# Patient Record
Sex: MALE | Race: BLACK OR AFRICAN AMERICAN | URBAN - METROPOLITAN AREA
[De-identification: names, ages, dates, MRNs, and addresses within clinical notes are randomized per-mention and may not be internally consistent; named-entity substitution may affect disease eponyms.]

---

## 2017-07-23 ENCOUNTER — EMERGENCY (EMERGENCY)
Facility: HOSPITAL | Age: 57
LOS: 0 days | Discharge: HOME | End: 2017-07-23
Admitting: INTERNAL MEDICINE

## 2017-07-23 DIAGNOSIS — R59.0 LOCALIZED ENLARGED LYMPH NODES: ICD-10-CM

## 2017-07-23 DIAGNOSIS — Z88.0 ALLERGY STATUS TO PENICILLIN: ICD-10-CM

## 2017-07-23 DIAGNOSIS — J02.9 ACUTE PHARYNGITIS, UNSPECIFIED: ICD-10-CM

## 2017-07-23 DIAGNOSIS — F31.9 BIPOLAR DISORDER, UNSPECIFIED: ICD-10-CM

## 2017-07-23 DIAGNOSIS — R47.81 SLURRED SPEECH: ICD-10-CM

## 2017-07-23 DIAGNOSIS — S92.909A UNSPECIFIED FRACTURE OF UNSPECIFIED FOOT, INITIAL ENCOUNTER FOR CLOSED FRACTURE: ICD-10-CM

## 2017-07-27 ENCOUNTER — EMERGENCY (EMERGENCY)
Facility: HOSPITAL | Age: 57
LOS: 0 days | Discharge: HOME | End: 2017-07-27
Admitting: INTERNAL MEDICINE

## 2017-07-27 DIAGNOSIS — Z79.899 OTHER LONG TERM (CURRENT) DRUG THERAPY: ICD-10-CM

## 2017-07-27 DIAGNOSIS — J02.9 ACUTE PHARYNGITIS, UNSPECIFIED: ICD-10-CM

## 2017-07-27 DIAGNOSIS — R47.81 SLURRED SPEECH: ICD-10-CM

## 2017-07-27 DIAGNOSIS — Z79.2 LONG TERM (CURRENT) USE OF ANTIBIOTICS: ICD-10-CM

## 2017-07-27 DIAGNOSIS — Z88.0 ALLERGY STATUS TO PENICILLIN: ICD-10-CM

## 2017-07-27 DIAGNOSIS — S92.909A UNSPECIFIED FRACTURE OF UNSPECIFIED FOOT, INITIAL ENCOUNTER FOR CLOSED FRACTURE: ICD-10-CM

## 2017-07-27 DIAGNOSIS — F31.9 BIPOLAR DISORDER, UNSPECIFIED: ICD-10-CM

## 2017-07-29 PROBLEM — Z00.00 ENCOUNTER FOR PREVENTIVE HEALTH EXAMINATION: Status: ACTIVE | Noted: 2017-07-29

## 2017-08-17 ENCOUNTER — APPOINTMENT (OUTPATIENT)
Dept: OTOLARYNGOLOGY | Facility: CLINIC | Age: 57
End: 2017-08-17
Payer: MEDICARE

## 2017-08-17 VITALS — WEIGHT: 245 LBS | BODY MASS INDEX: 37.13 KG/M2 | HEIGHT: 68 IN

## 2017-08-17 DIAGNOSIS — Z87.891 PERSONAL HISTORY OF NICOTINE DEPENDENCE: ICD-10-CM

## 2017-08-17 PROCEDURE — 31575 DIAGNOSTIC LARYNGOSCOPY: CPT

## 2017-08-17 PROCEDURE — 99204 OFFICE O/P NEW MOD 45 MIN: CPT | Mod: 25

## 2017-08-17 RX ORDER — OMEPRAZOLE 40 MG/1
40 CAPSULE, DELAYED RELEASE ORAL TWICE DAILY
Qty: 60 | Refills: 3 | Status: ACTIVE | COMMUNITY
Start: 2017-08-17 | End: 1900-01-01

## 2017-09-28 ENCOUNTER — APPOINTMENT (OUTPATIENT)
Dept: OTOLARYNGOLOGY | Facility: CLINIC | Age: 57
End: 2017-09-28

## 2017-10-26 ENCOUNTER — APPOINTMENT (OUTPATIENT)
Dept: OTOLARYNGOLOGY | Facility: CLINIC | Age: 57
End: 2017-10-26
Payer: MEDICAID

## 2017-10-26 DIAGNOSIS — J30.89 OTHER ALLERGIC RHINITIS: ICD-10-CM

## 2017-10-26 DIAGNOSIS — K21.9 GASTRO-ESOPHAGEAL REFLUX DISEASE W/OUT ESOPHAGITIS: ICD-10-CM

## 2017-10-26 DIAGNOSIS — J34.9 UNSPECIFIED DISORDER OF NOSE AND NASAL SINUSES: ICD-10-CM

## 2017-10-26 DIAGNOSIS — R07.0 PAIN IN THROAT: ICD-10-CM

## 2017-10-26 PROCEDURE — 31231 NASAL ENDOSCOPY DX: CPT

## 2017-10-26 PROCEDURE — 99213 OFFICE O/P EST LOW 20 MIN: CPT | Mod: 25

## 2017-10-26 RX ORDER — FEXOFENADINE HCL 60 MG/1
60 TABLET, FILM COATED ORAL
Qty: 40 | Refills: 3 | Status: ACTIVE | COMMUNITY
Start: 2017-10-26 | End: 1900-01-01

## 2017-10-26 RX ORDER — AZELASTINE HYDROCHLORIDE 205.5 UG/1
0.15 SPRAY, METERED NASAL
Qty: 2 | Refills: 6 | Status: ACTIVE | COMMUNITY
Start: 2017-10-26 | End: 1900-01-01

## 2019-09-26 ENCOUNTER — EMERGENCY (EMERGENCY)
Facility: HOSPITAL | Age: 59
LOS: 0 days | Discharge: HOME | End: 2019-09-26
Admitting: EMERGENCY MEDICINE
Payer: MEDICARE

## 2019-09-26 VITALS
RESPIRATION RATE: 18 BRPM | SYSTOLIC BLOOD PRESSURE: 119 MMHG | OXYGEN SATURATION: 97 % | TEMPERATURE: 98 F | HEART RATE: 91 BPM | DIASTOLIC BLOOD PRESSURE: 67 MMHG

## 2019-09-26 DIAGNOSIS — M54.9 DORSALGIA, UNSPECIFIED: ICD-10-CM

## 2019-09-26 DIAGNOSIS — Z88.0 ALLERGY STATUS TO PENICILLIN: ICD-10-CM

## 2019-09-26 DIAGNOSIS — M54.5 LOW BACK PAIN: ICD-10-CM

## 2019-09-26 DIAGNOSIS — G89.29 OTHER CHRONIC PAIN: ICD-10-CM

## 2019-09-26 PROCEDURE — 99283 EMERGENCY DEPT VISIT LOW MDM: CPT

## 2019-09-26 NOTE — ED PROVIDER NOTE - PATIENT PORTAL LINK FT
You can access the FollowMyHealth Patient Portal offered by [facility] by registering at the following website: [email or web address]. By joining [employer]’s FollowMyHealth portal, you will also be able to view your health information using other applications (apps) compatible with our system.

## 2019-09-26 NOTE — ED PROVIDER NOTE - CLINICAL SUMMARY MEDICAL DECISION MAKING FREE TEXT BOX
prednisone taper; rehab referral; neurology referral; pt will follow up with PCP in 2-3 days; any new or worsening symptoms, pt will return to ER.

## 2019-09-26 NOTE — ED PROVIDER NOTE - NSFOLLOWUPINSTRUCTIONS_ED_ALL_ED_FT
[employer]® CareNotes®     :  [facility]             CHRONIC BACK PAIN - Discharge Care     Chronic Back Pain    WHAT YOU NEED TO KNOW:    Chronic back pain is back pain that lasts 3 months or longer. This may include pain that has not been controlled or does not improve with treatment. Your back pain may cause weakness or pain that spreads to your arms or legs.    DISCHARGE INSTRUCTIONS:    Call your doctor if:     You have severe pain.      You have new numbness, tingling, or weakness, especially in your lower back, legs, arms, or genital area.      You lose control of your bladder or bowel movements.       You have a fever or sudden weight loss.      You have new or worse pain.      You have questions or concerns about your condition or care.    Medicines: You may need any of the following:     NSAIDs help decrease swelling and pain or fever. This medicine is available with or without a doctor's order. NSAIDs can cause stomach bleeding or kidney problems in certain people. If you take blood thinner medicine, always ask your healthcare provider if NSAIDs are safe for you. Always read the medicine label and follow directions.      Acetaminophen decreases pain and fever. It is available without a doctor's order. Ask how much to take and how often to take it. Follow directions. Read the labels of all other medicines you are using to see if they also contain acetaminophen, or ask your doctor or pharmacist. Acetaminophen can cause liver damage if not taken correctly. Do not use more than 4 grams (4,000 milligrams) total of acetaminophen in one day.       Muscle relaxers help decrease muscle spasms and back pain.      Prescription pain medicine may be given. Ask your healthcare provider how to take this medicine safely. Some prescription pain medicines contain acetaminophen. Do not take other medicines that contain acetaminophen without talking to your healthcare provider. Too much acetaminophen may cause liver damage. Prescription pain medicine may cause constipation. Ask your healthcare provider how to prevent or treat constipation.       Take your medicine as directed. Contact your healthcare provider if you think your medicine is not helping or if you have side effects. Tell him or her if you are allergic to any medicine. Keep a list of the medicines, vitamins, and herbs you take. Include the amounts, and when and why you take them. Bring the list or the pill bottles to follow-up visits. Carry your medicine list with you in case of an emergency.    Manage your symptoms:     Apply ice for 15 to 20 minutes every hour, or as directed. Use an ice pack, or put crushed ice in a plastic bag. Cover it with a towel before you apply it to your skin. Ice decreases pain and helps prevent tissue damage.      Apply heat for 20 to 30 minutes every 2 hours, or as directed. Heat helps decrease pain and muscle spasms.      Use massage to loosen tense muscles. Massage may relieve back pain caused by tight muscles. Regular massages may help prevent this kind of back pain.      Ask about acupuncture for pain relief. Back pain is sometimes relieved with acupuncture. Talk to your healthcare provider before you get this treatment to make sure it is safe for you.    Other ways to relieve or prevent back pain:     Manage stress. Stress can cause back pain or make it worse. Some ways to reduce stress are listening to music, meditating, or using aromatherapy. It may help to talk with a therapist about anything that is causing you stress. Your healthcare provider can give you more information.      Stay active as much as you can without causing more pain. Ask your healthcare provider what exercises are right for you. Do not sit or lie down for long periods. This could make your back pain worse. Yoga or similar gentle movements may help relieve pain and tension in your back. Go slowly and do not strain your back as you do any movement.      Be careful when you lift heavy objects. Do not lift anything heavy until your pain is gone. Never strain your back when you lift a heavy item. If possible, ask someone to help you.      Go to physical therapy as directed. A physical therapist can teach you exercises to help improve movement and strength, and to decrease pain.    Follow up with your healthcare provider as directed: You may be referred to a sports medicine or spine specialist. Write down your questions so you remember to ask them during your visits.       © Copyright [employer] 2019 All illustrations and images included in CareNotes are the copyrighted property of [employer] or [employer].      back to top                      © Copyright [employer] 2019

## 2019-09-26 NOTE — ED ADULT NURSE NOTE - CHIEF COMPLAINT QUOTE
fell three months ago, complaining of lower back pain for three months, history of back pain for thirty years. no blood thinners.

## 2019-09-26 NOTE — ED PROVIDER NOTE - NSFOLLOWUPCLINICS_GEN_ALL_ED_FT
[facility] ([facility])  Rehabilitation  [facility] [address] [place] [address]  Phone: [phone]  Fax:   Follow Up Time: 1-3 Days    [facility]  Neurology  [address] [place] [address], [address]  [place] [address]  Phone: [phone]  Fax:   Follow Up Time: 1-3 Days

## 2019-09-26 NOTE — ED PROVIDER NOTE - NEUROLOGICAL, MLM
Alert and oriented, no focal deficits, no motor or sensory deficits.  DTR's 2+ bilaterally.  Antalgic gait.

## 2019-09-26 NOTE — ED PROVIDER NOTE - OBJECTIVE STATEMENT
59 y.o. male with a PMH of anxiety and chronic back pain presented to the ER c/o Right sided back pain for the past 10 years.  Pt had MRI 10 years ago which revealed lumbar "slipped discs."  Pt has not had appropriate follow up.  Denies fever, chills, abdominal pain, flank pain, chest pain, extremity weakness/paresthesias, bladder/bowel incontinence, saddle numbness, dysuria, hematuria, ataxia. No other complaints.

## 2019-09-26 NOTE — ED ADULT TRIAGE NOTE - CHIEF COMPLAINT QUOTE
fell three months ago, complaining of lower back pain for three months, history of back pain for thirty years fell three months ago, complaining of lower back pain for three months, history of back pain for thirty years. no blood thinners.

## 2019-09-26 NOTE — ED ADULT NURSE NOTE - OBJECTIVE STATEMENT
Patient present to ED with complains of back pain from a fall sustained 3 months ago, denies blood thinner or syncope after wards.

## 2019-09-26 NOTE — ED PROVIDER NOTE - MUSCULOSKELETAL, MLM
Spine appears normal, range of motion is not limited, no midline tenderness.  (+) Right lower lumbar paraspinal tenderness.

## 2023-03-17 ENCOUNTER — EMERGENCY (EMERGENCY)
Facility: HOSPITAL | Age: 63
LOS: 0 days | Discharge: HOME | End: 2023-03-17
Attending: STUDENT IN AN ORGANIZED HEALTH CARE EDUCATION/TRAINING PROGRAM
Payer: MEDICARE

## 2023-03-17 VITALS
SYSTOLIC BLOOD PRESSURE: 167 MMHG | OXYGEN SATURATION: 97 % | RESPIRATION RATE: 20 BRPM | TEMPERATURE: 99 F | HEART RATE: 89 BPM | DIASTOLIC BLOOD PRESSURE: 81 MMHG

## 2023-03-17 DIAGNOSIS — F41.9 ANXIETY DISORDER, UNSPECIFIED: ICD-10-CM

## 2023-03-17 DIAGNOSIS — Z76.0 ENCOUNTER FOR ISSUE OF REPEAT PRESCRIPTION: ICD-10-CM

## 2023-03-17 DIAGNOSIS — F32.A DEPRESSION, UNSPECIFIED: ICD-10-CM

## 2023-03-17 DIAGNOSIS — Z88.0 ALLERGY STATUS TO PENICILLIN: ICD-10-CM

## 2023-03-17 PROCEDURE — 90792 PSYCH DIAG EVAL W/MED SRVCS: CPT | Mod: GC

## 2023-03-17 PROCEDURE — 99284 EMERGENCY DEPT VISIT MOD MDM: CPT

## 2023-03-17 PROCEDURE — 99281 EMR DPT VST MAYX REQ PHY/QHP: CPT

## 2023-03-17 RX ORDER — DULOXETINE HYDROCHLORIDE 30 MG/1
1 CAPSULE, DELAYED RELEASE ORAL
Qty: 15 | Refills: 0
Start: 2023-03-17 | End: 2023-03-31

## 2023-03-17 RX ORDER — OXCARBAZEPINE 300 MG/1
1 TABLET, FILM COATED ORAL
Qty: 30 | Refills: 0
Start: 2023-03-17 | End: 2023-03-31

## 2023-03-17 RX ORDER — TRAZODONE HCL 50 MG
1 TABLET ORAL
Qty: 15 | Refills: 0
Start: 2023-03-17 | End: 2023-03-31

## 2023-03-17 RX ORDER — VENLAFAXINE HCL 75 MG
1 CAPSULE, EXT RELEASE 24 HR ORAL
Qty: 15 | Refills: 0
Start: 2023-03-17 | End: 2023-03-31

## 2023-03-17 NOTE — BH CONSULTATION LIAISON ASSESSMENT NOTE - HPI (INCLUDE ILLNESS QUALITY, SEVERITY, DURATION, TIMING, CONTEXT, MODIFYING FACTORS, ASSOCIATED SIGNS AND SYMPTOMS)
Mr. Laguna is a 62 year old  male, , domiciled in a private house alone, unemployed on disability, with a past psychiatric history of bipolar 2 disorder with 2 hospitalizations at [facility] when he was 12 years old and a past medical history of chronic back pain and bilateral orthopedic foot surgeries presenting to the ED for refills of his medications. Medical team said that, should his prescriptions not be refilled today, he is at risk to return to the ED for refills. Psychiatry was consulted for medication management.    On approach, patient is sitting comfortably in his chair and not appearing in any acute distress. He was able to get up and move to an exam room to talk more privately, ambulating with a cane. He was cooperative to interview, albeit a little irritable when having to retell his story and anxious while pleading to help with medication refills. He said that he recently changed his insurance thinking that the new one would be better. However, his primary medical doctor doesn't accept the new insurance and his psychiatrist retired and closed down her office. He explained that he is diagnosed with bipolar 2 disorder and struggles with depression and anxiety as well as chronic back pain. He explains that without his medications, he experiences "shakes" and "nervous breakdowns" which he describes as lightheadedness, jolting in his knees, and falling. He took his last dose of medications this morning. He says that it usually takes about 2 days without medications for the symptoms to return, therefore he is worried about wasting any time without getting refills. He says that he is in the process of switching his insurance back to his old one. He explained that he has previously been admitted to [facility] 8 years ago after bilateral orthopedic foot surgeries when he had a "nervous breakdown" and also needed a boot for his right foot. Patient has no history of seizures and no current suicidal ideation, homicidal ideation, auditory hallucinations, or visual hallucinations. His pharmacy is [facility] at [address] [place] [address] ([phone]).  Patient provided a medication list that was incongruent with the pharmacy's list.     Pharmacy was called. Per pharmacy, medication list is as follows:  Venlafaxine 25mg, 1 tablet with food every morning- last filled 3/14/23  Trazodone 100mg, 2 tablets at bedtime everday- last filled 2/16/23   	patient's list says that he takes 1 tablet twice a day  Oxycarbazepine 600mg BID- last filled 2/16/23  Duloxetine 30mg, 1 tablet every morning- last filled 3/3/23  Duloxetine 60mg, q1d- last filled 1/15/23 with a 90 day supply  	patient's list says that he takes duloxetine 60mg BID  Alprazolam 1mg BID- last filled 2/16/23  	patient's list says that he takes 2mg four times a day  Per pharmacy, prescribing doctor is Dr. José Antonio Moore ([phone]).    Ex-wife, Leslie ([phone]) was called. Per ex wife, patient has a history of getting anxious when his medications are running low. He reportedly has no history of any self-harm or suicidal ideation or attempts.

## 2023-03-17 NOTE — ED PROVIDER NOTE - PATIENT PORTAL LINK FT
You can access the FollowMyHealth Patient Portal offered by [facility] by registering at the following website: [email or web address]. By joining [employer]’s FollowMyHealth portal, you will also be able to view your health information using other applications (apps) compatible with our system.

## 2023-03-17 NOTE — ED PROVIDER NOTE - PHYSICAL EXAMINATION
CONSTITUTIONAL: in no acute distress, afebrile  SKIN: Warm, dry  EYES: No conjunctival injection. EOMI. PERRLA  ENT: No nasal discharge; oropharynx nonerythematous; airway clear  NECK: Supple; non tender  CARD:  Regular rate and rhythm  RESP: CTAB  ABD: Soft NTND; No guarding or rebound tenderness  EXT: Normal ROM.  No clubbing or cyanosis.  No edema. No calf tenderness  NEURO: A&O x3, grossly unremarkable, no focal deficits  PSYCH: Cooperative, no SI/HI, AH/VH

## 2023-03-17 NOTE — BH CONSULTATION LIAISON ASSESSMENT NOTE - NSBHCONSULTFOLLOWAFTERCARE_PSY_A_CORE FT
Recommended that patient's duloxetine, venlafaxine, oxcarbasepine, and trazodone be prescribed again for a 2 week supply. He is also recommended for referral to the Adult Medicine at [facility] clinic at [address] [place] [address] [phone]) for help with prescriptions while he is undergoing insurance problems.   Recommended that patient's duloxetine,, oxcarbazepine, Trazadone to be prescribed again for a 2 week supply,  venlafaxine (was last prescribed on 3/14/23),  He is also recommended for referral to the Adult Medicine at [facility] clinic at [address] [place] [address] [phone]) for help with prescriptions while he is undergoing insurance problems.   Recommended that patient's duloxetine,, oxcarbazepine, Trazadone to be prescribed again for a 2 week supply,  venlafaxine (was last prescribed on 3/14/23),  He is also recommended for referral to outpatient psychiatry referral to [facility] located at [address] [place] [phone]; [phone]      Adult Medicine at [facility] at [address] [place] [phone]) for help with prescriptions while he is undergoing insurance problems.

## 2023-03-17 NOTE — BH CONSULTATION LIAISON ASSESSMENT NOTE - SUMMARY
Mr. Laguna is a 62 year old male with a past psychiatric history of bipolar 2 disorder with 2 hospitalizations at [facility] when he was 12 years old and a past medical history of chronic back pain and bilateral orthopedic foot surgeries presenting to the ED for refills of his medications. Psychiatry was consulted for medication management. He recently changed his insurance but his primary medical doctor doesn't accept the new insurance and his psychiatrist retired and closed down her office. Without his medications, he experiences "shakes" and "nervous breakdowns" which he describes as lightheadedness, jolting in his knees, and falling. He took his last dose of medications this morning. He is in the process of switching his insurance back to his old one. Patient has no history of seizures and no past or current suicidal ideation or attempts (also confirmed by his ex-wife, Leslie, [phone]), homicidal ideation, auditory hallucinations, or visual hallucinations. His pharmacy is [facility] at [address] [place] [address] ([phone]).   Per pharmacy, medication list is as follows:  Venlafaxine 25mg, 1 tablet with food every morning- last filled 3/14/23  Trazodone 100mg, 2 tablets at bedtime everday- last filled 2/16/23   	patient's list says that he takes 1 tablet twice a day  Oxycarbazepine 600mg BID- last filled 2/16/23  Duloxetine 30mg, 1 tablet every morning- last filled 3/3/23  Duloxetine 60mg, q1d- last filled 1/15/23 with a 90 day supply  	patient's list says that he takes duloxetine 60mg BID  Alprazolam 1mg BID- last filled 2/16/23  	patient's list says that he takes 2mg four times a day  Per pharmacy, prescribing doctor is Dr. José Antonio Moore ([phone]).    From the psychiatric perspective, patient's symptoms of lightheadedness, jolting in his knees, and falling when he is off his medications is consistent with SNRI withdrawal. As the patient is on 2 SNRIs (venlafaxine and duloxetine), we will recommend that those be prescribed again along with his oxcarbazepine and trazodone for a 2 week supply. He is also recommended for referral to the Adult Medicine at [facility] at [address] [place] [address] [phone]) for help with prescriptions while he is undergoing insurance problems. He has no psychiatric contraindications for discharge.

## 2023-03-17 NOTE — ED PROVIDER NOTE - NSFOLLOWUPCLINICS_GEN_ALL_ED_FT
[facility]  Medicine  [address], [place]   Phone: [phone]  Fax:   Follow Up Time: 1-3 Days    [facility]  OP Mental Health  [address] [place] [address]  Phone: [phone]  Fax:   Follow Up Time: 1-3 Days

## 2023-03-17 NOTE — ED PROVIDER NOTE - NSFOLLOWUPINSTRUCTIONS_ED_ALL_ED_FT
*************- Please  the medication sent to your pharmacy and take as prescribed  - Please follow up with the Psychiatry Clinic and the Medicine Clinic for follow up and further management**********      Generalized Anxiety Disorder, Adult  Generalized anxiety disorder (MEERA) is a mental health disorder. People with this condition constantly worry about everyday events. Unlike normal anxiety, worry related to MEERA is not triggered by a specific event. These worries also do not fade or get better with time. MEERA interferes with life functions, including relationships, work, and school.    MEERA can vary from mild to severe. People with severe MEERA can have intense waves of anxiety with physical symptoms (panic attacks).    What are the causes?  The exact cause of MEERA is not known.    What increases the risk?  This condition is more likely to develop in:    Women.  People who have a family history of anxiety disorders.  People who are very shy.  People who experience very stressful life events, such as the death of a loved one.  People who have a very stressful family environment.    What are the signs or symptoms?  People with MEERA often worry excessively about many things in their lives, such as their health and family. They may also be overly concerned about:    Doing well at work.  Being on time.  Natural disasters.  Friendships.    Physical symptoms of MEERA include:    Fatigue.  Muscle tension or having muscle twitches.  Trembling or feeling shaky.  Being easily startled.  Feeling like your heart is pounding or racing.  Feeling out of breath or like you cannot take a deep breath.  Having trouble falling asleep or staying asleep.  Sweating.  Nausea, diarrhea, or irritable bowel syndrome (IBS).  Headaches.  Trouble concentrating or remembering facts.  Restlessness.  Irritability.    How is this diagnosed?  Your health care provider can diagnose MEERA based on your symptoms and medical history. You will also have a physical exam. The health care provider will ask specific questions about your symptoms, including how severe they are, when they started, and if they come and go. Your health care provider may ask you about your use of alcohol or drugs, including prescription medicines. Your health care provider may refer you to a mental health specialist for further evaluation.    Your health care provider will do a thorough examination and may perform additional tests to rule out other possible causes of your symptoms.    To be diagnosed with MEERA, a person must have anxiety that:    Is out of his or her control.  Affects several different aspects of his or her life, such as work and relationships.  Causes distress that makes him or her unable to take part in normal activities.  Includes at least three physical symptoms of MEERA, such as restlessness, fatigue, trouble concentrating, irritability, muscle tension, or sleep problems.    Before your health care provider can confirm a diagnosis of MEERA, these symptoms must be present more days than they are not, and they must last for six months or longer.    How is this treated?  The following therapies are usually used to treat MEERA:    Medicine. Antidepressant medicine is usually prescribed for long-term daily control. Antianxiety medicines may be added in severe cases, especially when panic attacks occur.  Talk therapy (psychotherapy). Certain types of talk therapy can be helpful in treating MEERA by providing support, education, and guidance. Options include:    Cognitive behavioral therapy (CBT). People learn coping skills and techniques to ease their anxiety. They learn to identify unrealistic or negative thoughts and behaviors and to replace them with positive ones.  Acceptance and commitment therapy (ACT). This treatment teaches people how to be mindful as a way to cope with unwanted thoughts and feelings.  Biofeedback. This process trains you to manage your body's response (physiological response) through breathing techniques and relaxation methods. You will work with a therapist while machines are used to monitor your physical symptoms.    Stress management techniques. These include yoga, meditation, and exercise.    ImageA mental health specialist can help determine which treatment is best for you. Some people see improvement with one type of therapy. However, other people require a combination of therapies.    Follow these instructions at home:  Take over-the-counter and prescription medicines only as told by your health care provider.  Try to maintain a normal routine.  Try to anticipate stressful situations and allow extra time to manage them.  Practice any stress management or self-calming techniques as taught by your health care provider.  Do not punish yourself for setbacks or for not making progress.  Try to recognize your accomplishments, even if they are small.  Keep all follow-up visits as told by your health care provider. This is important.  Contact a health care provider if:  Your symptoms do not get better.  Your symptoms get worse.  You have signs of depression, such as:    A persistently sad, cranky, or irritable mood.  Loss of enjoyment in activities that used to bring you flori.  Change in weight or eating.  Changes in sleeping habits.  Avoiding friends or family members.  Loss of energy for normal tasks.  Feelings of guilt or worthlessness.    Get help right away if:  You have serious thoughts about hurting yourself or others.  If you ever feel like you may hurt yourself or others, or have thoughts about taking your own life, get help right away. You can go to your nearest emergency department or call:     Your local emergency services (911 in the [place]   A suicide crisis helpline, such as the National Suicide Prevention Lifeline at 1-518.251.8468. This is open 24 hours a day.     Summary  Generalized anxiety disorder (MEERA) is a mental health disorder that involves worry that is not triggered by a specific event.  People with MEERA often worry excessively about many things in their lives, such as their health and family.  MEERA may cause physical symptoms such as restlessness, trouble concentrating, sleep problems, frequent sweating, nausea, diarrhea, headaches, and trembling or muscle twitching.  A mental health specialist can help determine which treatment is best for you. Some people see improvement with one type of therapy. However, other people require a combination of therapies.  This information is not intended to replace advice given to you by your health care provider. Make sure you discuss any questions you have with your health care provider. *************- Please  the medication sent to your pharmacy and take as prescribed  - Please follow up with the Psychiatry Clinic and the Medicine Clinic for follow up and further management**********    Adult Medicine at [facility] clinic at [address] [place] [id number] [phone]) for help with prescriptions      Generalized Anxiety Disorder, Adult  Generalized anxiety disorder (MEERA) is a mental health disorder. People with this condition constantly worry about everyday events. Unlike normal anxiety, worry related to MEERA is not triggered by a specific event. These worries also do not fade or get better with time. MEERA interferes with life functions, including relationships, work, and school.    MEERA can vary from mild to severe. People with severe MEERA can have intense waves of anxiety with physical symptoms (panic attacks).    What are the causes?  The exact cause of MEERA is not known.    What increases the risk?  This condition is more likely to develop in:    Women.  People who have a family history of anxiety disorders.  People who are very shy.  People who experience very stressful life events, such as the death of a loved one.  People who have a very stressful family environment.    What are the signs or symptoms?  People with MEERA often worry excessively about many things in their lives, such as their health and family. They may also be overly concerned about:    Doing well at work.  Being on time.  Natural disasters.  Friendships.    Physical symptoms of MEERA include:    Fatigue.  Muscle tension or having muscle twitches.  Trembling or feeling shaky.  Being easily startled.  Feeling like your heart is pounding or racing.  Feeling out of breath or like you cannot take a deep breath.  Having trouble falling asleep or staying asleep.  Sweating.  Nausea, diarrhea, or irritable bowel syndrome (IBS).  Headaches.  Trouble concentrating or remembering facts.  Restlessness.  Irritability.    How is this diagnosed?  Your health care provider can diagnose MEERA based on your symptoms and medical history. You will also have a physical exam. The health care provider will ask specific questions about your symptoms, including how severe they are, when they started, and if they come and go. Your health care provider may ask you about your use of alcohol or drugs, including prescription medicines. Your health care provider may refer you to a mental health specialist for further evaluation.    Your health care provider will do a thorough examination and may perform additional tests to rule out other possible causes of your symptoms.    To be diagnosed with MEERA, a person must have anxiety that:    Is out of his or her control.  Affects several different aspects of his or her life, such as work and relationships.  Causes distress that makes him or her unable to take part in normal activities.  Includes at least three physical symptoms of MEERA, such as restlessness, fatigue, trouble concentrating, irritability, muscle tension, or sleep problems.    Before your health care provider can confirm a diagnosis of MEERA, these symptoms must be present more days than they are not, and they must last for six months or longer.    How is this treated?  The following therapies are usually used to treat MEERA:    Medicine. Antidepressant medicine is usually prescribed for long-term daily control. Antianxiety medicines may be added in severe cases, especially when panic attacks occur.  Talk therapy (psychotherapy). Certain types of talk therapy can be helpful in treating MEERA by providing support, education, and guidance. Options include:    Cognitive behavioral therapy (CBT). People learn coping skills and techniques to ease their anxiety. They learn to identify unrealistic or negative thoughts and behaviors and to replace them with positive ones.  Acceptance and commitment therapy (ACT). This treatment teaches people how to be mindful as a way to cope with unwanted thoughts and feelings.  Biofeedback. This process trains you to manage your body's response (physiological response) through breathing techniques and relaxation methods. You will work with a therapist while machines are used to monitor your physical symptoms.    Stress management techniques. These include yoga, meditation, and exercise.    ImageA mental health specialist can help determine which treatment is best for you. Some people see improvement with one type of therapy. However, other people require a combination of therapies.    Follow these instructions at home:  Take over-the-counter and prescription medicines only as told by your health care provider.  Try to maintain a normal routine.  Try to anticipate stressful situations and allow extra time to manage them.  Practice any stress management or self-calming techniques as taught by your health care provider.  Do not punish yourself for setbacks or for not making progress.  Try to recognize your accomplishments, even if they are small.  Keep all follow-up visits as told by your health care provider. This is important.  Contact a health care provider if:  Your symptoms do not get better.  Your symptoms get worse.  You have signs of depression, such as:    A persistently sad, cranky, or irritable mood.  Loss of enjoyment in activities that used to bring you flori.  Change in weight or eating.  Changes in sleeping habits.  Avoiding friends or family members.  Loss of energy for normal tasks.  Feelings of guilt or worthlessness.    Get help right away if:  You have serious thoughts about hurting yourself or others.  If you ever feel like you may hurt yourself or others, or have thoughts about taking your own life, get help right away. You can go to your nearest emergency department or call:     Your local emergency services (911 in the [place]   A suicide crisis helpline, such as the National Suicide Prevention Lifeline at 1-960.345.1882. This is open 24 hours a day.     Summary  Generalized anxiety disorder (MEERA) is a mental health disorder that involves worry that is not triggered by a specific event.  People with MEERA often worry excessively about many things in their lives, such as their health and family.  MEERA may cause physical symptoms such as restlessness, trouble concentrating, sleep problems, frequent sweating, nausea, diarrhea, headaches, and trembling or muscle twitching.  A mental health specialist can help determine which treatment is best for you. Some people see improvement with one type of therapy. However, other people require a combination of therapies.  This information is not intended to replace advice given to you by your health care provider. Make sure you discuss any questions you have with your health care provider.

## 2023-03-17 NOTE — ED PROVIDER NOTE - PROGRESS NOTE DETAILS
AH - Patient evaluated by psychiatry, chart review done, psychiatry recommending we prescribe the following medications: Venlafaxine, trazodone, oxcarbazepine, duloxetine.  Rx sent.  15-day supply sent.  Patient will follow-up with psych clinic and medicine clinic.  Patient agrees with plan.  Psychiatry agrees with discharge.  VSS.  No acute concerns.  Patient to be discharged from ED. Any available test results were discussed with patient and/or family. Verbal instructions given, including instructions to return to ED immediately for any new, worsening, or concerning symptoms. Strict return precautions given. Written discharge instructions additionally given, including follow-up plan

## 2023-03-17 NOTE — ED PROVIDER NOTE - CLINICAL SUMMARY MEDICAL DECISION MAKING FREE TEXT BOX
62-year-old male presented today with medication refill.  Patient reports that he was unable to follow-up with a psychiatrist secondary to his insurance changing and thus resulted in having no medications.  Patient's case discussed with psychiatric team on-call who evaluated patient and recommended 15-day prescription supply for the above medications.  Patient will follow-up with psychiatric clinic for next dose of medications.  Patient discharged to follow-up accordingly.  Return precautions explained.  Patient denies any SI/HI or psychosis symptoms such as auditory visual hallucinations.

## 2023-03-17 NOTE — BH CONSULTATION LIAISON ASSESSMENT NOTE - NSBHCHARTREVIEWVS_PSY_A_CORE FT
Vital Signs Last 24 Hrs  T(C): 37.1 (17 Mar 2023 11:23), Max: 37.1 (17 Mar 2023 11:23)  T(F): 98.8 (17 Mar 2023 11:23), Max: 98.8 (17 Mar 2023 11:23)  HR: 89 (17 Mar 2023 11:23) (89 - 89)  BP: 167/81 (17 Mar 2023 11:23) (167/81 - 167/81)  BP(mean): --  RR: 20 (17 Mar 2023 11:23) (20 - 20)  SpO2: 97% (17 Mar 2023 11:23) (97% - 97%)    Parameters below as of 17 Mar 2023 11:23  Patient On (Oxygen Delivery Method): room air

## 2023-03-17 NOTE — ED PROVIDER NOTE - OBJECTIVE STATEMENT
62-year-old male with PMH of anxiety, depression, mood disorder who presents for medication refill.  Patient states he ran out of his medications a few days ago, unable to follow-up with psychiatrist because he changed his insurance.  Last refilled about 1 month ago.  Patient does not have follow-up.  Patient denies any acute concerns.  Patient denies any fevers, chills, chest pain, shortness of breath, abdominal pain, nausea, vomiting.

## 2023-03-17 NOTE — BH CONSULTATION LIAISON ASSESSMENT NOTE - NSBHATTESTCOMMENTATTENDFT_PSY_A_CORE
62 year old male with reported bipolar II disorder on multiple psychotropic medications which were confirmed and last refilled was this month, who presented to ED for medication refills since he ran out of medication and insurance is no longer accepted at by his previous doctor. Information is confirmed after collateral obtained from his ex-wife. Pharmacy is contacted and confirmed. In the ED, patient is not exhibiting any abnormal behavior that would indicate any decompensated symptoms. He is not manic, he is not psychotic, he expresses appropriate concerns about being anxious for not having his medications. In the past without his medications he quickly decompensated, and he hopes this does not occur in the setting of medication non-compliance. He is not endorsing any suicidal or homicidal ideation. He is hopeful. We recommend to discharge this patient on 15 day supply of Oxcarbazepine 600mg po bid, Trazadone 100mg po bid, , Duloxetine 60mg po bid. No need to discharge him on Alprazolam. Effexor 25mg po daily was last prescribed on 3/14/23). As per Istop, patient last prescribed Xanax 1mg po bid for 30 days on 2/16/23 and patient has been off of it for many days.    62 year old male with reported bipolar II disorder on multiple psychotropic medications which were confirmed and last refilled was this month, who presented to ED for medication refills since he ran out of medication and insurance is no longer accepted at by his previous doctor. Information is confirmed after collateral obtained from his ex-wife. Pharmacy is contacted and confirmed. In the ED, patient is not exhibiting any abnormal behavior that would indicate any decompensated symptoms. He is not manic, he is not psychotic, he expresses appropriate concerns about being anxious for not having his medications. In the past without his medications he quickly decompensated, and he hopes this does not occur in the setting of medication non-compliance. He is not endorsing any suicidal or homicidal ideation. He is hopeful. We recommend to discharge this patient on 15 day supply of Oxcarbazepine 600mg po bid, Trazadone 100mg po bid, , Duloxetine 60mg po bid. No need to discharge him on Alprazolam. Effexor 25mg po daily was last prescribed on 3/14/23). As per Istop, patient last prescribed Xanax 1mg po bid for 30 days on 2/16/23 and patient has been off of it for many days.   Patient can be referred to referral to [facility] located at [address] [place] [address], [id number]; [phone]

## 2023-03-17 NOTE — BH CONSULTATION LIAISON ASSESSMENT NOTE - ADDITIONAL DETAILS / COMMENTS
Patient was very understanding when explained why we couldn't prescribe his Xanax to him, he was cooperative and understanding with our plan.

## 2023-03-17 NOTE — ED ADULT TRIAGE NOTE - CHIEF COMPLAINT QUOTE
Pt here for medication refills, insurance changed March 3, pt pmd does not accept pt insurance any longer

## 2023-04-04 NOTE — BH CONSULTATION LIAISON ASSESSMENT NOTE - EMPLOYMENT
23    Previously you received an email informing you that we are in need of the information seen below. As of today, we have not received a response to this request.       As this is an [employer] requirement, even if you have returned to work already, please reply to this email. Be sure to enter all the requested information in the spaces provided.       Original message   We have received notification that you have a positive COVID-19 test result. Please complete the questionnaire and return it within 24 hours. 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      Return the questionnaire to [email or web address]      Name   Tammy Major      1985   MRN   [id number]   Emp ID   [id number]   Site TM works   [place]   Department  works   ED   Occupation/Role   Rn   Manager/Leader   [employer]      Please answer all of the following questions.    Best phone number to reach you   [phone]   Have you received a COVID Vaccine?   Yes   How many doses?   1   Type of Vaccine (Pfizer/Moderna/J&J)?   J&J   Date of symptom onset      Date test performed/resulted      Last date worked and shift      Did you have direct patient contact?      Were the patients wearing masks?      What type of PPE did you wear (including type of mask & eye protection)      Did you come into contact with anyone at work without PPE? (<6 ft and > 15 min cumulative time) If yes:     Who      Dates      Location         Note:    **In the workplace, for patient to teammate contact, an exposure is when you come into close contact with a known COVID-19 positive patient, for > 15 minutes (cumulative), and < 6 feet, without an N95 mask and approved eye protection.       **For non-patient contact (this includes teammate to teammate), an exposure is when you come into close contact with a known COVID-19 positive person, for > 15 minutes (cumulative), and < 6 feet, without either of you wearing a procedural mask.          Disabled

## 2023-05-15 ENCOUNTER — APPOINTMENT (OUTPATIENT)
Dept: UROLOGY | Facility: CLINIC | Age: 63
End: 2023-05-15

## 2023-09-12 ENCOUNTER — APPOINTMENT (OUTPATIENT)
Dept: UROLOGY | Facility: CLINIC | Age: 63
End: 2023-09-12
Payer: MEDICARE

## 2023-09-12 ENCOUNTER — LABORATORY RESULT (OUTPATIENT)
Age: 63
End: 2023-09-12

## 2023-09-12 VITALS
BODY MASS INDEX: 33.34 KG/M2 | WEIGHT: 220 LBS | DIASTOLIC BLOOD PRESSURE: 82 MMHG | TEMPERATURE: 98 F | SYSTOLIC BLOOD PRESSURE: 113 MMHG | HEART RATE: 86 BPM | HEIGHT: 68 IN

## 2023-09-12 DIAGNOSIS — Z80.42 FAMILY HISTORY OF MALIGNANT NEOPLASM OF PROSTATE: ICD-10-CM

## 2023-09-12 DIAGNOSIS — R97.20 ELEVATED PROSTATE, SPECIFIC ANTIGEN [PSA]: ICD-10-CM

## 2023-09-12 PROCEDURE — 99203 OFFICE O/P NEW LOW 30 MIN: CPT

## 2023-09-13 LAB
APPEARANCE: CLEAR
BILIRUBIN URINE: NEGATIVE
BLOOD URINE: NEGATIVE
COLOR: YELLOW
GLUCOSE QUALITATIVE U: NEGATIVE MG/DL
KETONES URINE: NEGATIVE MG/DL
LEUKOCYTE ESTERASE URINE: ABNORMAL
NITRITE URINE: NEGATIVE
PH URINE: 7.5
PROTEIN URINE: NORMAL MG/DL
PSA FREE FLD-MCNC: 8 %
PSA FREE SERPL-MCNC: 0.33 NG/ML
PSA SERPL-MCNC: 3.94 NG/ML
SPECIFIC GRAVITY URINE: 1.02
UROBILINOGEN URINE: 0.2 MG/DL

## 2023-10-02 ENCOUNTER — NON-APPOINTMENT (OUTPATIENT)
Age: 63
End: 2023-10-02

## 2023-10-10 ENCOUNTER — APPOINTMENT (OUTPATIENT)
Dept: UROLOGY | Facility: CLINIC | Age: 63
End: 2023-10-10

## 2023-12-09 ENCOUNTER — EMERGENCY (EMERGENCY)
Facility: HOSPITAL | Age: 63
LOS: 0 days | Discharge: HOME | End: 2023-12-09
Attending: EMERGENCY MEDICINE
Payer: MEDICARE

## 2023-12-09 VITALS
SYSTOLIC BLOOD PRESSURE: 190 MMHG | RESPIRATION RATE: 18 BRPM | HEART RATE: 70 BPM | HEIGHT: 68 IN | WEIGHT: 210.1 LBS | TEMPERATURE: 100 F | DIASTOLIC BLOOD PRESSURE: 93 MMHG | OXYGEN SATURATION: 96 %

## 2023-12-09 DIAGNOSIS — R05.1 ACUTE COUGH: ICD-10-CM

## 2023-12-09 DIAGNOSIS — Z88.0 ALLERGY STATUS TO PENICILLIN: ICD-10-CM

## 2023-12-09 DIAGNOSIS — R00.1 BRADYCARDIA, UNSPECIFIED: ICD-10-CM

## 2023-12-09 DIAGNOSIS — I10 ESSENTIAL (PRIMARY) HYPERTENSION: ICD-10-CM

## 2023-12-09 DIAGNOSIS — R06.2 WHEEZING: ICD-10-CM

## 2023-12-09 DIAGNOSIS — Z87.891 PERSONAL HISTORY OF NICOTINE DEPENDENCE: ICD-10-CM

## 2023-12-09 DIAGNOSIS — U07.1 COVID-19: ICD-10-CM

## 2023-12-09 LAB
ALBUMIN SERPL ELPH-MCNC: 4.6 G/DL — SIGNIFICANT CHANGE UP (ref 3.5–5.2)
ALBUMIN SERPL ELPH-MCNC: 4.6 G/DL — SIGNIFICANT CHANGE UP (ref 3.5–5.2)
ALP SERPL-CCNC: 107 U/L — SIGNIFICANT CHANGE UP (ref 30–115)
ALP SERPL-CCNC: 107 U/L — SIGNIFICANT CHANGE UP (ref 30–115)
ALT FLD-CCNC: 23 U/L — SIGNIFICANT CHANGE UP (ref 0–41)
ALT FLD-CCNC: 23 U/L — SIGNIFICANT CHANGE UP (ref 0–41)
ANION GAP SERPL CALC-SCNC: 10 MMOL/L — SIGNIFICANT CHANGE UP (ref 7–14)
ANION GAP SERPL CALC-SCNC: 10 MMOL/L — SIGNIFICANT CHANGE UP (ref 7–14)
AST SERPL-CCNC: 20 U/L — SIGNIFICANT CHANGE UP (ref 0–41)
AST SERPL-CCNC: 20 U/L — SIGNIFICANT CHANGE UP (ref 0–41)
BASE EXCESS BLDV CALC-SCNC: 6.4 MMOL/L — HIGH (ref -2–3)
BASE EXCESS BLDV CALC-SCNC: 6.4 MMOL/L — HIGH (ref -2–3)
BASOPHILS # BLD AUTO: 0.04 K/UL — SIGNIFICANT CHANGE UP (ref 0–0.2)
BASOPHILS # BLD AUTO: 0.04 K/UL — SIGNIFICANT CHANGE UP (ref 0–0.2)
BASOPHILS NFR BLD AUTO: 0.5 % — SIGNIFICANT CHANGE UP (ref 0–1)
BASOPHILS NFR BLD AUTO: 0.5 % — SIGNIFICANT CHANGE UP (ref 0–1)
BILIRUB SERPL-MCNC: 0.2 MG/DL — SIGNIFICANT CHANGE UP (ref 0.2–1.2)
BILIRUB SERPL-MCNC: 0.2 MG/DL — SIGNIFICANT CHANGE UP (ref 0.2–1.2)
BUN SERPL-MCNC: 8 MG/DL — LOW (ref 10–20)
BUN SERPL-MCNC: 8 MG/DL — LOW (ref 10–20)
CA-I SERPL-SCNC: 1.21 MMOL/L — SIGNIFICANT CHANGE UP (ref 1.15–1.33)
CA-I SERPL-SCNC: 1.21 MMOL/L — SIGNIFICANT CHANGE UP (ref 1.15–1.33)
CALCIUM SERPL-MCNC: 9.5 MG/DL — SIGNIFICANT CHANGE UP (ref 8.4–10.5)
CALCIUM SERPL-MCNC: 9.5 MG/DL — SIGNIFICANT CHANGE UP (ref 8.4–10.5)
CHLORIDE SERPL-SCNC: 102 MMOL/L — SIGNIFICANT CHANGE UP (ref 98–110)
CHLORIDE SERPL-SCNC: 102 MMOL/L — SIGNIFICANT CHANGE UP (ref 98–110)
CO2 SERPL-SCNC: 27 MMOL/L — SIGNIFICANT CHANGE UP (ref 17–32)
CO2 SERPL-SCNC: 27 MMOL/L — SIGNIFICANT CHANGE UP (ref 17–32)
CREAT SERPL-MCNC: 0.9 MG/DL — SIGNIFICANT CHANGE UP (ref 0.7–1.5)
CREAT SERPL-MCNC: 0.9 MG/DL — SIGNIFICANT CHANGE UP (ref 0.7–1.5)
EGFR: 96 ML/MIN/1.73M2 — SIGNIFICANT CHANGE UP
EGFR: 96 ML/MIN/1.73M2 — SIGNIFICANT CHANGE UP
EOSINOPHIL # BLD AUTO: 0.06 K/UL — SIGNIFICANT CHANGE UP (ref 0–0.7)
EOSINOPHIL # BLD AUTO: 0.06 K/UL — SIGNIFICANT CHANGE UP (ref 0–0.7)
EOSINOPHIL NFR BLD AUTO: 0.8 % — SIGNIFICANT CHANGE UP (ref 0–8)
EOSINOPHIL NFR BLD AUTO: 0.8 % — SIGNIFICANT CHANGE UP (ref 0–8)
FLUAV AG NPH QL: SIGNIFICANT CHANGE UP
FLUAV AG NPH QL: SIGNIFICANT CHANGE UP
FLUBV AG NPH QL: SIGNIFICANT CHANGE UP
FLUBV AG NPH QL: SIGNIFICANT CHANGE UP
GAS PNL BLDV: 136 MMOL/L — SIGNIFICANT CHANGE UP (ref 136–145)
GAS PNL BLDV: 136 MMOL/L — SIGNIFICANT CHANGE UP (ref 136–145)
GAS PNL BLDV: SIGNIFICANT CHANGE UP
GLUCOSE SERPL-MCNC: 86 MG/DL — SIGNIFICANT CHANGE UP (ref 70–99)
GLUCOSE SERPL-MCNC: 86 MG/DL — SIGNIFICANT CHANGE UP (ref 70–99)
HCO3 BLDV-SCNC: 32 MMOL/L — HIGH (ref 22–29)
HCO3 BLDV-SCNC: 32 MMOL/L — HIGH (ref 22–29)
HCT VFR BLD CALC: 40.7 % — LOW (ref 42–52)
HCT VFR BLD CALC: 40.7 % — LOW (ref 42–52)
HCT VFR BLDA CALC: 42 % — SIGNIFICANT CHANGE UP (ref 39–51)
HCT VFR BLDA CALC: 42 % — SIGNIFICANT CHANGE UP (ref 39–51)
HGB BLD CALC-MCNC: 14.1 G/DL — SIGNIFICANT CHANGE UP (ref 12.6–17.4)
HGB BLD CALC-MCNC: 14.1 G/DL — SIGNIFICANT CHANGE UP (ref 12.6–17.4)
HGB BLD-MCNC: 13.6 G/DL — LOW (ref 14–18)
HGB BLD-MCNC: 13.6 G/DL — LOW (ref 14–18)
IMM GRANULOCYTES NFR BLD AUTO: 0.3 % — SIGNIFICANT CHANGE UP (ref 0.1–0.3)
IMM GRANULOCYTES NFR BLD AUTO: 0.3 % — SIGNIFICANT CHANGE UP (ref 0.1–0.3)
LACTATE BLDV-MCNC: 0.9 MMOL/L — SIGNIFICANT CHANGE UP (ref 0.5–2)
LACTATE BLDV-MCNC: 0.9 MMOL/L — SIGNIFICANT CHANGE UP (ref 0.5–2)
LYMPHOCYTES # BLD AUTO: 1.37 K/UL — SIGNIFICANT CHANGE UP (ref 1.2–3.4)
LYMPHOCYTES # BLD AUTO: 1.37 K/UL — SIGNIFICANT CHANGE UP (ref 1.2–3.4)
LYMPHOCYTES # BLD AUTO: 17.6 % — LOW (ref 20.5–51.1)
LYMPHOCYTES # BLD AUTO: 17.6 % — LOW (ref 20.5–51.1)
MCHC RBC-ENTMCNC: 27.9 PG — SIGNIFICANT CHANGE UP (ref 27–31)
MCHC RBC-ENTMCNC: 27.9 PG — SIGNIFICANT CHANGE UP (ref 27–31)
MCHC RBC-ENTMCNC: 33.4 G/DL — SIGNIFICANT CHANGE UP (ref 32–37)
MCHC RBC-ENTMCNC: 33.4 G/DL — SIGNIFICANT CHANGE UP (ref 32–37)
MCV RBC AUTO: 83.6 FL — SIGNIFICANT CHANGE UP (ref 80–94)
MCV RBC AUTO: 83.6 FL — SIGNIFICANT CHANGE UP (ref 80–94)
MONOCYTES # BLD AUTO: 0.61 K/UL — HIGH (ref 0.1–0.6)
MONOCYTES # BLD AUTO: 0.61 K/UL — HIGH (ref 0.1–0.6)
MONOCYTES NFR BLD AUTO: 7.8 % — SIGNIFICANT CHANGE UP (ref 1.7–9.3)
MONOCYTES NFR BLD AUTO: 7.8 % — SIGNIFICANT CHANGE UP (ref 1.7–9.3)
NEUTROPHILS # BLD AUTO: 5.68 K/UL — SIGNIFICANT CHANGE UP (ref 1.4–6.5)
NEUTROPHILS # BLD AUTO: 5.68 K/UL — SIGNIFICANT CHANGE UP (ref 1.4–6.5)
NEUTROPHILS NFR BLD AUTO: 73 % — SIGNIFICANT CHANGE UP (ref 42.2–75.2)
NEUTROPHILS NFR BLD AUTO: 73 % — SIGNIFICANT CHANGE UP (ref 42.2–75.2)
NRBC # BLD: 0 /100 WBCS — SIGNIFICANT CHANGE UP (ref 0–0)
NRBC # BLD: 0 /100 WBCS — SIGNIFICANT CHANGE UP (ref 0–0)
PCO2 BLDV: 48 MMHG — SIGNIFICANT CHANGE UP (ref 42–55)
PCO2 BLDV: 48 MMHG — SIGNIFICANT CHANGE UP (ref 42–55)
PH BLDV: 7.43 — SIGNIFICANT CHANGE UP (ref 7.32–7.43)
PH BLDV: 7.43 — SIGNIFICANT CHANGE UP (ref 7.32–7.43)
PLATELET # BLD AUTO: 244 K/UL — SIGNIFICANT CHANGE UP (ref 130–400)
PLATELET # BLD AUTO: 244 K/UL — SIGNIFICANT CHANGE UP (ref 130–400)
PMV BLD: 11.2 FL — HIGH (ref 7.4–10.4)
PMV BLD: 11.2 FL — HIGH (ref 7.4–10.4)
PO2 BLDV: 35 MMHG — SIGNIFICANT CHANGE UP (ref 25–45)
PO2 BLDV: 35 MMHG — SIGNIFICANT CHANGE UP (ref 25–45)
POTASSIUM BLDV-SCNC: 3.4 MMOL/L — LOW (ref 3.5–5.1)
POTASSIUM BLDV-SCNC: 3.4 MMOL/L — LOW (ref 3.5–5.1)
POTASSIUM SERPL-MCNC: 3.8 MMOL/L — SIGNIFICANT CHANGE UP (ref 3.5–5)
POTASSIUM SERPL-MCNC: 3.8 MMOL/L — SIGNIFICANT CHANGE UP (ref 3.5–5)
POTASSIUM SERPL-SCNC: 3.8 MMOL/L — SIGNIFICANT CHANGE UP (ref 3.5–5)
POTASSIUM SERPL-SCNC: 3.8 MMOL/L — SIGNIFICANT CHANGE UP (ref 3.5–5)
PROT SERPL-MCNC: 7.2 G/DL — SIGNIFICANT CHANGE UP (ref 6–8)
PROT SERPL-MCNC: 7.2 G/DL — SIGNIFICANT CHANGE UP (ref 6–8)
RBC # BLD: 4.87 M/UL — SIGNIFICANT CHANGE UP (ref 4.7–6.1)
RBC # BLD: 4.87 M/UL — SIGNIFICANT CHANGE UP (ref 4.7–6.1)
RBC # FLD: 15.1 % — HIGH (ref 11.5–14.5)
RBC # FLD: 15.1 % — HIGH (ref 11.5–14.5)
RSV RNA NPH QL NAA+NON-PROBE: SIGNIFICANT CHANGE UP
RSV RNA NPH QL NAA+NON-PROBE: SIGNIFICANT CHANGE UP
SAO2 % BLDV: 39.5 % — LOW (ref 67–88)
SAO2 % BLDV: 39.5 % — LOW (ref 67–88)
SARS-COV-2 RNA SPEC QL NAA+PROBE: DETECTED
SARS-COV-2 RNA SPEC QL NAA+PROBE: DETECTED
SODIUM SERPL-SCNC: 139 MMOL/L — SIGNIFICANT CHANGE UP (ref 135–146)
SODIUM SERPL-SCNC: 139 MMOL/L — SIGNIFICANT CHANGE UP (ref 135–146)
TROPONIN T SERPL-MCNC: <0.01 NG/ML — SIGNIFICANT CHANGE UP
TROPONIN T SERPL-MCNC: <0.01 NG/ML — SIGNIFICANT CHANGE UP
WBC # BLD: 7.78 K/UL — SIGNIFICANT CHANGE UP (ref 4.8–10.8)
WBC # BLD: 7.78 K/UL — SIGNIFICANT CHANGE UP (ref 4.8–10.8)
WBC # FLD AUTO: 7.78 K/UL — SIGNIFICANT CHANGE UP (ref 4.8–10.8)
WBC # FLD AUTO: 7.78 K/UL — SIGNIFICANT CHANGE UP (ref 4.8–10.8)

## 2023-12-09 PROCEDURE — 80053 COMPREHEN METABOLIC PANEL: CPT

## 2023-12-09 PROCEDURE — 84295 ASSAY OF SERUM SODIUM: CPT

## 2023-12-09 PROCEDURE — 85025 COMPLETE CBC W/AUTO DIFF WBC: CPT

## 2023-12-09 PROCEDURE — 99285 EMERGENCY DEPT VISIT HI MDM: CPT

## 2023-12-09 PROCEDURE — 82803 BLOOD GASES ANY COMBINATION: CPT

## 2023-12-09 PROCEDURE — 82330 ASSAY OF CALCIUM: CPT

## 2023-12-09 PROCEDURE — 93308 TTE F-UP OR LMTD: CPT | Mod: 26

## 2023-12-09 PROCEDURE — 93005 ELECTROCARDIOGRAM TRACING: CPT

## 2023-12-09 PROCEDURE — 99285 EMERGENCY DEPT VISIT HI MDM: CPT | Mod: 25

## 2023-12-09 PROCEDURE — 36415 COLL VENOUS BLD VENIPUNCTURE: CPT

## 2023-12-09 PROCEDURE — 84484 ASSAY OF TROPONIN QUANT: CPT

## 2023-12-09 PROCEDURE — 85018 HEMOGLOBIN: CPT

## 2023-12-09 PROCEDURE — 0241U: CPT

## 2023-12-09 PROCEDURE — 94640 AIRWAY INHALATION TREATMENT: CPT

## 2023-12-09 PROCEDURE — 84132 ASSAY OF SERUM POTASSIUM: CPT

## 2023-12-09 PROCEDURE — 96374 THER/PROPH/DIAG INJ IV PUSH: CPT | Mod: XU

## 2023-12-09 PROCEDURE — 71045 X-RAY EXAM CHEST 1 VIEW: CPT

## 2023-12-09 PROCEDURE — 85014 HEMATOCRIT: CPT

## 2023-12-09 PROCEDURE — 83605 ASSAY OF LACTIC ACID: CPT

## 2023-12-09 PROCEDURE — 93010 ELECTROCARDIOGRAM REPORT: CPT

## 2023-12-09 PROCEDURE — 71045 X-RAY EXAM CHEST 1 VIEW: CPT | Mod: 26

## 2023-12-09 PROCEDURE — 93308 TTE F-UP OR LMTD: CPT

## 2023-12-09 PROCEDURE — 82962 GLUCOSE BLOOD TEST: CPT

## 2023-12-09 RX ORDER — IPRATROPIUM/ALBUTEROL SULFATE 18-103MCG
3 AEROSOL WITH ADAPTER (GRAM) INHALATION ONCE
Refills: 0 | Status: COMPLETED | OUTPATIENT
Start: 2023-12-09 | End: 2023-12-09

## 2023-12-09 RX ORDER — AMLODIPINE BESYLATE 2.5 MG/1
1 TABLET ORAL
Qty: 30 | Refills: 0
Start: 2023-12-09 | End: 2024-01-07

## 2023-12-09 RX ORDER — ALBUTEROL 90 UG/1
1 AEROSOL, METERED ORAL ONCE
Refills: 0 | Status: DISCONTINUED | OUTPATIENT
Start: 2023-12-09 | End: 2023-12-09

## 2023-12-09 RX ORDER — TRAZODONE HCL 50 MG
1 TABLET ORAL
Qty: 30 | Refills: 0
Start: 2023-12-09 | End: 2024-01-07

## 2023-12-09 RX ADMIN — Medication 3 MILLILITER(S): at 11:23

## 2023-12-09 RX ADMIN — Medication 125 MILLIGRAM(S): at 11:23

## 2023-12-09 RX ADMIN — Medication 3 MILLILITER(S): at 11:24

## 2023-12-09 NOTE — ED ADULT NURSE NOTE - NSFALLUNIVINTERV_ED_ALL_ED
Bed/Stretcher in lowest position, wheels locked, appropriate side rails in place/Call bell, personal items and telephone in reach/Instruct patient to call for assistance before getting out of bed/chair/stretcher/Non-slip footwear applied when patient is off stretcher/[place] to call system/Physically safe environment - no spills, clutter or unnecessary equipment/Purposeful proactive rounding/Room/bathroom lighting operational, light cord in reach Bed/Stretcher in lowest position, wheels locked, appropriate side rails in place/Call bell, personal items and telephone in reach/Instruct patient to call for assistance before getting out of bed/chair/stretcher/Non-slip footwear applied when patient is off stretcher/[place] to call system/Physically safe environment - no spills, clutter or unnecessary equipment/Purposeful proactive rounding/Room/bathroom lighting operational, light cord in reach

## 2023-12-09 NOTE — ED PROVIDER NOTE - PROGRESS NOTE DETAILS
ROCÍO: code stemi called on arrival for JIN in V2, V3 and STD in lateral leads. Seen by cardiology at bedside, code stemi cancelled. Nebs/steroids given ROCÍO: pt with improvement in wheezing still slightly present but much improved, feels better with nebulizer treatment ROCÍO: wheezing has resolved after nebs/steroids. Pt feels much improved. Given albuterol pump in ED. Will d/c home with outpatient f/u, supportive care and return precautions advised. Pt does not the remember the name of his PMD but states he has the name and phone number written down at home.     Patient to be discharged from ED. Any available test results were discussed with patient and/or family. Verbal instructions given, including instructions to return to ED immediately for any new, worsening, or concerning symptoms. Patient endorsed understanding. Written discharge instructions additionally given, including follow-up plan.

## 2023-12-09 NOTE — ED PROVIDER NOTE - ATTENDING CONTRIBUTION TO CARE
I personally evaluated patient. I agree with the findings and plan with all documentation on chart except as documented  in my note.    63-year-old male past medical history of uncontrolled hypertension, depression, smoking history presents to the emergency department for cough and wheezing over the past week.  Patient has no history of DVT or PE.  Patient no cardiac history.  Patient denies any active chest pain.  STEMI code was called by Dr. Griffin after reviewing EKG and patient was brought into the critical care emergency department.  I immediately evaluated patient.  Patient has no active pain or diaphoresis.  Patient has no prior EKGs to compare.  Patient has a dry cough but is otherwise eating and drinking normally.  No prior history of asthma.  Patient placed on a monitor and pads placed on patient, vital signs reviewed.  Patient is not in respiratory distress.  Patient has normal heart sounds with no murmurs.  Patient has bilateral expiratory wheezing and dry cough.  Gross neurological exam is unremarkable.  Immediate bedside ultrasound performed and consistent with LVH with no regional wall motion abnormalities and a normal EF.  Patient has no pericardial effusion.  Patient has no signs of fluid overload or calf tenderness.  IV placed and labs sent, chest x-ray performed, patient given steroids and nebs.  Will follow-up workup and reassess.  STEMI code was canceled by cardiology after cardiology fellow evaluated patient at bedside and reviewed EKG.

## 2023-12-09 NOTE — ED PROVIDER NOTE - PATIENT PORTAL LINK FT
You can access the FollowMyHealth Patient Portal offered by [facility] by registering at the following website: [email or web address]. By joining [employer]’s FollowMyHealth portal, you will also be able to view your health information using other applications (apps) compatible with our system. You can access the FollowMyHealth Patient Portal offered by [facility] by registering at the following website: [email or web address]. By joining [employer]’s FollowMyHealth portal, you will also be able to view your health information using other applications (apps) compatible with our system.

## 2023-12-09 NOTE — CHART NOTE - NSCHARTNOTEFT_GEN_A_CORE
Code STEMI called   STEMI team responded immediately to bedside   ECG, PCOUS and case reviewed with interventional cardiologist on call   STEMI code cancelled   Discussed with ED team

## 2023-12-09 NOTE — ED PROVIDER NOTE - NSPTACCESSSVCSAPPTDETAILS_ED_ALL_ED_FT
wheezing, pt requesting for sleep apnea testing cardio for abnormal ekg, pulmonary for wheezing, pt requesting for sleep apnea testing

## 2023-12-09 NOTE — ED PROVIDER NOTE - CLINICAL SUMMARY MEDICAL DECISION MAKING FREE TEXT BOX
Labs reviewed and patient has normal white blood cell count, normal VBG with lactate, normal troponin.  Patient given steroids and nebs and feels improved.  Chest x-ray reviewed and is unremarkable.  Viral panel is pending.  Patient is ambulatory and walking at baseline.  Will give inhaler and patient be discharged home. Patient is a good candidate to attempt outpatient management. Supportive care and home care discussed in detail. Patient aware they may have to return for re-evaluation and possible admission if outpatient treatment fails. Strict return precautions discussed.

## 2023-12-09 NOTE — ED PROVIDER NOTE - OBJECTIVE STATEMENT
62 y/o M with PMH of HTN presents to ED for evaluation of cough and wheezing x 1 week.  States his mom is currently a patient in the ER and wants to get checked out while he was here.  Notes nonproductive cough.  No fever, CP, SOB, palpitations, abdominal pain, N/V/D, lower extremity swelling or pain.  Denies history of asthma but states he is to have an inhaler in the past that he does not have anymore.

## 2023-12-09 NOTE — ED PROVIDER NOTE - NSFOLLOWUPINSTRUCTIONS_ED_ALL_ED_FT
Our Emergency Department Referral Coordinators will be reaching out to you in the next 24-48 hours from 9:00am to 5:00pm with a follow up appointment. Please expect a phone call from the hospital in that time frame. If you do not receive a call or if you have any questions or concerns, you can reach them at   [phone]     Viral Respiratory Infection  A respiratory infection is an illness that affects part of the respiratory system, such as the lungs, nose, or throat. A respiratory infection that is caused by a virus is called a viral respiratory infection.    Common types of viral respiratory infections include:  A cold.  The flu (influenza).  A respiratory syncytial virus (RSV) infection.  What are the causes?  This condition is caused by a virus. The virus may spread through contact with droplets or direct contact with infected people or their mucus or secretions. The virus may spread from person to person (is contagious).    What are the signs or symptoms?  Symptoms of this condition include:  A stuffy or runny nose.  A sore throat or cough.  Shortness of breath or difficulty breathing.  Yellow or green mucus (sputum).  Other symptoms may include:  A fever.  Sweating or chills.  Fatigue.  Achy muscles.  A headache.  How is this diagnosed?  This condition may be diagnosed based on:  Your symptoms.  A physical exam.  Testing of secretions from the nose or throat.  Chest X-ray.  How is this treated?  This condition may be treated with medicines, such as:  Antiviral medicine. This may shorten the length of time a person has symptoms.  Expectorants. These make it easier to cough up mucus.  Decongestant nasal sprays.  Acetaminophen or NSAIDs, such as ibuprofen, to relieve fever and pain.  Antibiotic medicines are not prescribed for viral infections.This is because antibiotics are designed to kill bacteria. They do not kill viruses.    Follow these instructions at home:  Managing pain and congestion    Take over-the-counter and prescription medicines only as told by your health care provider.  If you have a sore throat, gargle with a mixture of salt and water 3–4 times a day or as needed. To make salt water, completely dissolve ½–1 tsp (3–6 g) of salt in 1 cup (237 mL) of warm water.  Use nose drops made from salt water to ease congestion and soften raw skin around your nose.  Take 2 tsp (10 mL) of honey at bedtime to lessen coughing at night.  Do not give honey to children who are younger than 1 year.  Drink enough fluid to keep your urine pale yellow. This helps prevent dehydration and helps loosen up mucus.  General instructions    A sign telling the reader not to smoke.  Rest as much as possible.  Do not drink alcohol.  Do not use any products that contain nicotine or tobacco. These products include cigarettes, chewing tobacco, and vaping devices, such as e-cigarettes. If you need help quitting, ask your health care provider.  Keep all follow-up visits. This is important.  How is this prevented?  Washing hands with soap and water.  A person covering her mouth and nose with a cloth while sneezing.  Get an annual flu shot. You may get the flu shot in late summer, fall, or winter. Ask your health care provider when you should get your flu shot.  Avoid spreading your infection to other people. If you are sick:  Wash your hands with soap and water often, especially after you cough or sneeze. Wash for at least 20 seconds. If soap and water are not available, use alcohol-based hand .  Cover your mouth when you cough. Cover your nose and mouth when you sneeze.  Do not share cups or eating utensils.  Clean commonly used objects often. Clean commonly touched surfaces.  Stay home from work or school as told by your health care provider.  Avoid contact with people who are sick during cold and flu season. This is generally fall and winter.  Contact a health care provider if:  Your symptoms last for 10 days or longer.  Your symptoms get worse over time.  You have severe sinus pain in your face or forehead.  The glands in your jaw or neck become very swollen.  You have shortness of breath.  Get help right away if you:  Feel pain or pressure in your chest.  Have trouble breathing.  Faint or feel like you will faint.  Have severe and persistent vomiting.  Feel confused or disoriented.  These symptoms may represent a serious problem that is an emergency. Do not wait to see if the symptoms will go away. Get medical help right away. Call your local emergency services (911 in the [place] Do not drive yourself to the hospital.    Summary  A respiratory infection is an illness that affects part of the respiratory system, such as the lungs, nose, or throat. A respiratory infection that is caused by a virus is called a viral respiratory infection.  Common types of viral respiratory infections include a cold, influenza, and respiratory syncytial virus (RSV) infection.  Symptoms of this condition include a stuffy or runny nose, cough, fatigue, achy muscles, sore throat, and fevers or chills.  Antibiotic medicines are not prescribed for viral infections. This is because antibiotics are designed to kill bacteria. They are not effective against viruses.  This information is not intended to replace advice given to you by your health care provider. Make sure you discuss any questions you have with your health care provider. Our Emergency Department Referral Coordinators will be reaching out to you in the next 24-48 hours from 9:00am to 5:00pm with a follow up appointment. Please expect a phone call from the hospital in that time frame. If you do not receive a call or if you have any questions or concerns, you can reach them at   [phone]     Viral Respiratory Infection  A respiratory infection is an illness that affects part of the respiratory system, such as the lungs, nose, or throat. A respiratory infection that is caused by a virus is called a viral respiratory infection.    Common types of viral respiratory infections include:  A cold.  The flu (influenza).  A respiratory syncytial virus (RSV) infection.  What are the causes?  This condition is caused by a virus. The virus may spread through contact with droplets or direct contact with infected people or their mucus or secretions. The virus may spread from person to person (is contagious).    What are the signs or symptoms?  Symptoms of this condition include:  A stuffy or runny nose.  A sore throat or cough.  Shortness of breath or difficulty breathing.  Yellow or green mucus (sputum).  Other symptoms may include:  A fever.  Sweating or chills.  Fatigue.  Achy muscles.  A headache.  How is this diagnosed?  This condition may be diagnosed based on:  Your symptoms.  A physical exam.  Testing of secretions from the nose or throat.  Chest X-ray.  How is this treated?  This condition may be treated with medicines, such as:  Antiviral medicine. This may shorten the length of time a person has symptoms.  Expectorants. These make it easier to cough up mucus.  Decongestant nasal sprays.  Acetaminophen or NSAIDs, such as ibuprofen, to relieve fever and pain.  Antibiotic medicines are not prescribed for viral infections.This is because antibiotics are designed to kill bacteria. They do not kill viruses.    Follow these instructions at home:  Managing pain and congestion    Take over-the-counter and prescription medicines only as told by your health care provider.  If you have a sore throat, gargle with a mixture of salt and water 3–4 times a day or as needed. To make salt water, completely dissolve ½–1 tsp (3–6 g) of salt in 1 cup (237 mL) of warm water.  Use nose drops made from salt water to ease congestion and soften raw skin around your nose.  Take 2 tsp (10 mL) of honey at bedtime to lessen coughing at night.  Do not give honey to children who are younger than 1 year.  Drink enough fluid to keep your urine pale yellow. This helps prevent dehydration and helps loosen up mucus.  General instructions    A sign telling the reader not to smoke.  Rest as much as possible.  Do not drink alcohol.  Do not use any products that contain nicotine or tobacco. These products include cigarettes, chewing tobacco, and vaping devices, such as e-cigarettes. If you need help quitting, ask your health care provider.  Keep all follow-up visits. This is important.  How is this prevented?  Washing hands with soap and water.  A person covering her mouth and nose with a cloth while sneezing.  Get an annual flu shot. You may get the flu shot in late summer, fall, or winter. Ask your health care provider when you should get your flu shot.  Avoid spreading your infection to other people. If you are sick:  Wash your hands with soap and water often, especially after you cough or sneeze. Wash for at least 20 seconds. If soap and water are not available, use alcohol-based hand .  Cover your mouth when you cough. Cover your nose and mouth when you sneeze.  Do not share cups or eating utensils.  Clean commonly used objects often. Clean commonly touched surfaces.  Stay home from work or school as told by your health care provider.  Avoid contact with people who are sick during cold and flu season. This is generally fall and winter.  Contact a health care provider if:  Your symptoms last for 10 days or longer.  Your symptoms get worse over time.  You have severe sinus pain in your face or forehead.  The glands in your jaw or neck become very swollen.  You have shortness of breath.  Get help right away if you:  Feel pain or pressure in your chest.  Have trouble breathing.  Faint or feel like you will faint.  Have severe and persistent vomiting.  Feel confused or disoriented.  These symptoms may represent a serious problem that is an emergency. Do not wait to see if the symptoms will go away. Get medical help right away. Call your local emergency services (911 in the [place] Do not drive yourself to the hospital.    Summary  A respiratory infection is an illness that affects part of the respiratory system, such as the lungs, nose, or throat. A respiratory infection that is caused by a virus is called a viral respiratory infection.  Common types of viral respiratory infections include a cold, influenza, and respiratory syncytial virus (RSV) infection.  Symptoms of this condition include a stuffy or runny nose, cough, fatigue, achy muscles, sore throat, and fevers or chills.  Antibiotic medicines are not prescribed for viral infections. This is because antibiotics are designed to kill bacteria. They are not effective against viruses.  This information is not intended to replace advice given to you by your health care provider. Make sure you discuss any questions you have with your health care provider.

## 2023-12-09 NOTE — ED PROVIDER NOTE - DIFFERENTIAL DIAGNOSIS
The differential diagnosis for patients clinical presentation includes but is not limited to:  viral syndrome, ACS, MI, aortic dissection, pneumothorax, pneumonia, MSK, pulmonary embolism Differential Diagnosis

## 2023-12-09 NOTE — ED PROVIDER NOTE - PHYSICAL EXAMINATION
VITAL SIGNS: I have reviewed nursing notes and confirm.  CONSTITUTIONAL: Well-developed; well-nourished; in no acute distress.  SKIN: Skin exam is warm and dry, no acute rash.  HEAD: Normocephalic; atraumatic.  EYES: PERRL, conjunctiva and sclera clear.  ENT: mmm  CARD: S1, S2 normal; no murmurs, gallops, or rubs. Regular rate and rhythm.  RESP: Normal respiratory effort, no tachypnea or distress. Lungs CTAB with wheezing diffusely   ABD: soft, NT/ND.  EXT: Normal ROM. No clubbing, cyanosis or edema.  NEURO: Alert, oriented. Grossly unremarkable. No focal deficits.  PSYCH: Cooperative, appropriate.

## 2023-12-25 ENCOUNTER — EMERGENCY (EMERGENCY)
Facility: HOSPITAL | Age: 63
LOS: 0 days | Discharge: HOME | End: 2023-12-25
Attending: EMERGENCY MEDICINE
Payer: MEDICARE

## 2023-12-25 VITALS
HEART RATE: 84 BPM | WEIGHT: 210.1 LBS | SYSTOLIC BLOOD PRESSURE: 167 MMHG | TEMPERATURE: 97 F | OXYGEN SATURATION: 100 % | DIASTOLIC BLOOD PRESSURE: 82 MMHG | RESPIRATION RATE: 18 BRPM | HEIGHT: 68 IN

## 2023-12-25 DIAGNOSIS — F41.9 ANXIETY DISORDER, UNSPECIFIED: ICD-10-CM

## 2023-12-25 DIAGNOSIS — E78.5 HYPERLIPIDEMIA, UNSPECIFIED: ICD-10-CM

## 2023-12-25 DIAGNOSIS — R45.851 SUICIDAL IDEATIONS: ICD-10-CM

## 2023-12-25 DIAGNOSIS — F31.9 BIPOLAR DISORDER, UNSPECIFIED: ICD-10-CM

## 2023-12-25 DIAGNOSIS — Z20.822 CONTACT WITH AND (SUSPECTED) EXPOSURE TO COVID-19: ICD-10-CM

## 2023-12-25 DIAGNOSIS — Z88.0 ALLERGY STATUS TO PENICILLIN: ICD-10-CM

## 2023-12-25 DIAGNOSIS — I10 ESSENTIAL (PRIMARY) HYPERTENSION: ICD-10-CM

## 2023-12-25 DIAGNOSIS — R00.1 BRADYCARDIA, UNSPECIFIED: ICD-10-CM

## 2023-12-25 PROBLEM — Z86.59 PERSONAL HISTORY OF OTHER MENTAL AND BEHAVIORAL DISORDERS: Chronic | Status: ACTIVE | Noted: 2023-12-09

## 2023-12-25 LAB
ANION GAP SERPL CALC-SCNC: 10 MMOL/L — SIGNIFICANT CHANGE UP (ref 7–14)
ANION GAP SERPL CALC-SCNC: 10 MMOL/L — SIGNIFICANT CHANGE UP (ref 7–14)
APAP SERPL-MCNC: <5 UG/ML — LOW (ref 10–30)
APAP SERPL-MCNC: <5 UG/ML — LOW (ref 10–30)
APPEARANCE UR: ABNORMAL
APPEARANCE UR: ABNORMAL
BASOPHILS # BLD AUTO: 0.04 K/UL — SIGNIFICANT CHANGE UP (ref 0–0.2)
BASOPHILS # BLD AUTO: 0.04 K/UL — SIGNIFICANT CHANGE UP (ref 0–0.2)
BASOPHILS NFR BLD AUTO: 0.6 % — SIGNIFICANT CHANGE UP (ref 0–1)
BASOPHILS NFR BLD AUTO: 0.6 % — SIGNIFICANT CHANGE UP (ref 0–1)
BILIRUB UR-MCNC: NEGATIVE — SIGNIFICANT CHANGE UP
BILIRUB UR-MCNC: NEGATIVE — SIGNIFICANT CHANGE UP
BUN SERPL-MCNC: 13 MG/DL — SIGNIFICANT CHANGE UP (ref 10–20)
BUN SERPL-MCNC: 13 MG/DL — SIGNIFICANT CHANGE UP (ref 10–20)
CALCIUM SERPL-MCNC: 9.4 MG/DL — SIGNIFICANT CHANGE UP (ref 8.4–10.5)
CALCIUM SERPL-MCNC: 9.4 MG/DL — SIGNIFICANT CHANGE UP (ref 8.4–10.5)
CHLORIDE SERPL-SCNC: 107 MMOL/L — SIGNIFICANT CHANGE UP (ref 98–110)
CHLORIDE SERPL-SCNC: 107 MMOL/L — SIGNIFICANT CHANGE UP (ref 98–110)
CO2 SERPL-SCNC: 27 MMOL/L — SIGNIFICANT CHANGE UP (ref 17–32)
CO2 SERPL-SCNC: 27 MMOL/L — SIGNIFICANT CHANGE UP (ref 17–32)
COLOR SPEC: YELLOW — SIGNIFICANT CHANGE UP
COLOR SPEC: YELLOW — SIGNIFICANT CHANGE UP
CREAT SERPL-MCNC: 1.2 MG/DL — SIGNIFICANT CHANGE UP (ref 0.7–1.5)
CREAT SERPL-MCNC: 1.2 MG/DL — SIGNIFICANT CHANGE UP (ref 0.7–1.5)
DIFF PNL FLD: NEGATIVE — SIGNIFICANT CHANGE UP
DIFF PNL FLD: NEGATIVE — SIGNIFICANT CHANGE UP
EGFR: 68 ML/MIN/1.73M2 — SIGNIFICANT CHANGE UP
EGFR: 68 ML/MIN/1.73M2 — SIGNIFICANT CHANGE UP
EOSINOPHIL # BLD AUTO: 0.06 K/UL — SIGNIFICANT CHANGE UP (ref 0–0.7)
EOSINOPHIL # BLD AUTO: 0.06 K/UL — SIGNIFICANT CHANGE UP (ref 0–0.7)
EOSINOPHIL NFR BLD AUTO: 0.9 % — SIGNIFICANT CHANGE UP (ref 0–8)
EOSINOPHIL NFR BLD AUTO: 0.9 % — SIGNIFICANT CHANGE UP (ref 0–8)
ETHANOL SERPL-MCNC: <10 MG/DL — SIGNIFICANT CHANGE UP
ETHANOL SERPL-MCNC: <10 MG/DL — SIGNIFICANT CHANGE UP
GLUCOSE SERPL-MCNC: 89 MG/DL — SIGNIFICANT CHANGE UP (ref 70–99)
GLUCOSE SERPL-MCNC: 89 MG/DL — SIGNIFICANT CHANGE UP (ref 70–99)
GLUCOSE UR QL: NEGATIVE MG/DL — SIGNIFICANT CHANGE UP
GLUCOSE UR QL: NEGATIVE MG/DL — SIGNIFICANT CHANGE UP
HCT VFR BLD CALC: 39.6 % — LOW (ref 42–52)
HCT VFR BLD CALC: 39.6 % — LOW (ref 42–52)
HGB BLD-MCNC: 13.3 G/DL — LOW (ref 14–18)
HGB BLD-MCNC: 13.3 G/DL — LOW (ref 14–18)
IMM GRANULOCYTES NFR BLD AUTO: 0.3 % — SIGNIFICANT CHANGE UP (ref 0.1–0.3)
IMM GRANULOCYTES NFR BLD AUTO: 0.3 % — SIGNIFICANT CHANGE UP (ref 0.1–0.3)
KETONES UR-MCNC: ABNORMAL MG/DL
KETONES UR-MCNC: ABNORMAL MG/DL
LEUKOCYTE ESTERASE UR-ACNC: NEGATIVE — SIGNIFICANT CHANGE UP
LEUKOCYTE ESTERASE UR-ACNC: NEGATIVE — SIGNIFICANT CHANGE UP
LYMPHOCYTES # BLD AUTO: 1.81 K/UL — SIGNIFICANT CHANGE UP (ref 1.2–3.4)
LYMPHOCYTES # BLD AUTO: 1.81 K/UL — SIGNIFICANT CHANGE UP (ref 1.2–3.4)
LYMPHOCYTES # BLD AUTO: 27.5 % — SIGNIFICANT CHANGE UP (ref 20.5–51.1)
LYMPHOCYTES # BLD AUTO: 27.5 % — SIGNIFICANT CHANGE UP (ref 20.5–51.1)
MCHC RBC-ENTMCNC: 28.4 PG — SIGNIFICANT CHANGE UP (ref 27–31)
MCHC RBC-ENTMCNC: 28.4 PG — SIGNIFICANT CHANGE UP (ref 27–31)
MCHC RBC-ENTMCNC: 33.6 G/DL — SIGNIFICANT CHANGE UP (ref 32–37)
MCHC RBC-ENTMCNC: 33.6 G/DL — SIGNIFICANT CHANGE UP (ref 32–37)
MCV RBC AUTO: 84.4 FL — SIGNIFICANT CHANGE UP (ref 80–94)
MCV RBC AUTO: 84.4 FL — SIGNIFICANT CHANGE UP (ref 80–94)
MONOCYTES # BLD AUTO: 0.51 K/UL — SIGNIFICANT CHANGE UP (ref 0.1–0.6)
MONOCYTES # BLD AUTO: 0.51 K/UL — SIGNIFICANT CHANGE UP (ref 0.1–0.6)
MONOCYTES NFR BLD AUTO: 7.8 % — SIGNIFICANT CHANGE UP (ref 1.7–9.3)
MONOCYTES NFR BLD AUTO: 7.8 % — SIGNIFICANT CHANGE UP (ref 1.7–9.3)
NEUTROPHILS # BLD AUTO: 4.14 K/UL — SIGNIFICANT CHANGE UP (ref 1.4–6.5)
NEUTROPHILS # BLD AUTO: 4.14 K/UL — SIGNIFICANT CHANGE UP (ref 1.4–6.5)
NEUTROPHILS NFR BLD AUTO: 62.9 % — SIGNIFICANT CHANGE UP (ref 42.2–75.2)
NEUTROPHILS NFR BLD AUTO: 62.9 % — SIGNIFICANT CHANGE UP (ref 42.2–75.2)
NITRITE UR-MCNC: NEGATIVE — SIGNIFICANT CHANGE UP
NITRITE UR-MCNC: NEGATIVE — SIGNIFICANT CHANGE UP
NRBC # BLD: 0 /100 WBCS — SIGNIFICANT CHANGE UP (ref 0–0)
NRBC # BLD: 0 /100 WBCS — SIGNIFICANT CHANGE UP (ref 0–0)
PH UR: 5.5 — SIGNIFICANT CHANGE UP (ref 5–8)
PH UR: 5.5 — SIGNIFICANT CHANGE UP (ref 5–8)
PLATELET # BLD AUTO: 208 K/UL — SIGNIFICANT CHANGE UP (ref 130–400)
PLATELET # BLD AUTO: 208 K/UL — SIGNIFICANT CHANGE UP (ref 130–400)
PMV BLD: 11.2 FL — HIGH (ref 7.4–10.4)
PMV BLD: 11.2 FL — HIGH (ref 7.4–10.4)
POTASSIUM SERPL-MCNC: 3.6 MMOL/L — SIGNIFICANT CHANGE UP (ref 3.5–5)
POTASSIUM SERPL-MCNC: 3.6 MMOL/L — SIGNIFICANT CHANGE UP (ref 3.5–5)
POTASSIUM SERPL-SCNC: 3.6 MMOL/L — SIGNIFICANT CHANGE UP (ref 3.5–5)
POTASSIUM SERPL-SCNC: 3.6 MMOL/L — SIGNIFICANT CHANGE UP (ref 3.5–5)
PROT UR-MCNC: SIGNIFICANT CHANGE UP MG/DL
PROT UR-MCNC: SIGNIFICANT CHANGE UP MG/DL
RBC # BLD: 4.69 M/UL — LOW (ref 4.7–6.1)
RBC # BLD: 4.69 M/UL — LOW (ref 4.7–6.1)
RBC # FLD: 14.7 % — HIGH (ref 11.5–14.5)
RBC # FLD: 14.7 % — HIGH (ref 11.5–14.5)
SALICYLATES SERPL-MCNC: <0.3 MG/DL — LOW (ref 4–30)
SALICYLATES SERPL-MCNC: <0.3 MG/DL — LOW (ref 4–30)
SARS-COV-2 RNA SPEC QL NAA+PROBE: SIGNIFICANT CHANGE UP
SARS-COV-2 RNA SPEC QL NAA+PROBE: SIGNIFICANT CHANGE UP
SODIUM SERPL-SCNC: 144 MMOL/L — SIGNIFICANT CHANGE UP (ref 135–146)
SODIUM SERPL-SCNC: 144 MMOL/L — SIGNIFICANT CHANGE UP (ref 135–146)
SP GR SPEC: 1.03 — SIGNIFICANT CHANGE UP (ref 1–1.03)
SP GR SPEC: 1.03 — SIGNIFICANT CHANGE UP (ref 1–1.03)
UROBILINOGEN FLD QL: 1 MG/DL — SIGNIFICANT CHANGE UP (ref 0.2–1)
UROBILINOGEN FLD QL: 1 MG/DL — SIGNIFICANT CHANGE UP (ref 0.2–1)
WBC # BLD: 6.58 K/UL — SIGNIFICANT CHANGE UP (ref 4.8–10.8)
WBC # BLD: 6.58 K/UL — SIGNIFICANT CHANGE UP (ref 4.8–10.8)
WBC # FLD AUTO: 6.58 K/UL — SIGNIFICANT CHANGE UP (ref 4.8–10.8)
WBC # FLD AUTO: 6.58 K/UL — SIGNIFICANT CHANGE UP (ref 4.8–10.8)

## 2023-12-25 PROCEDURE — 99285 EMERGENCY DEPT VISIT HI MDM: CPT

## 2023-12-25 PROCEDURE — 99284 EMERGENCY DEPT VISIT MOD MDM: CPT

## 2023-12-25 PROCEDURE — 99285 EMERGENCY DEPT VISIT HI MDM: CPT | Mod: 25

## 2023-12-25 PROCEDURE — 93005 ELECTROCARDIOGRAM TRACING: CPT

## 2023-12-25 PROCEDURE — 90792 PSYCH DIAG EVAL W/MED SRVCS: CPT | Mod: 95

## 2023-12-25 PROCEDURE — 93010 ELECTROCARDIOGRAM REPORT: CPT

## 2023-12-25 PROCEDURE — 36415 COLL VENOUS BLD VENIPUNCTURE: CPT

## 2023-12-25 PROCEDURE — 80048 BASIC METABOLIC PNL TOTAL CA: CPT

## 2023-12-25 PROCEDURE — 81001 URINALYSIS AUTO W/SCOPE: CPT

## 2023-12-25 PROCEDURE — 87635 SARS-COV-2 COVID-19 AMP PRB: CPT

## 2023-12-25 PROCEDURE — 80307 DRUG TEST PRSMV CHEM ANLYZR: CPT

## 2023-12-25 PROCEDURE — 85025 COMPLETE CBC W/AUTO DIFF WBC: CPT

## 2023-12-25 RX ORDER — ACETAMINOPHEN 500 MG
650 TABLET ORAL ONCE
Refills: 0 | Status: COMPLETED | OUTPATIENT
Start: 2023-12-25 | End: 2023-12-25

## 2023-12-25 RX ADMIN — Medication 650 MILLIGRAM(S): at 20:03

## 2023-12-25 NOTE — ED PROVIDER NOTE - NS ED ATTENDING STATEMENT MOD
This was a shared visit with the JOCELYNN. I reviewed and verified the documentation and independently performed the documented:

## 2023-12-25 NOTE — ED BEHAVIORAL HEALTH ASSESSMENT NOTE - DESCRIPTION
HLD, HTN Lives with wife in [facility] Pt was visibly upset. Towards end, he left the conversation. Lives with wife in [facility]

## 2023-12-25 NOTE — ED BEHAVIORAL HEALTH ASSESSMENT NOTE - REFERRAL / APPOINTMENT DETAILS
Provided with resources to seek outpatient psych care as he expressed to switch his current psychiatrist

## 2023-12-25 NOTE — ED PROVIDER NOTE - PHYSICAL EXAMINATION
CONSTITUTIONAL: Well-appearing; well-nourished; in no apparent distress.   NECK: Supple; non-tender; no cervical lymphadenopathy.   CARDIOVASCULAR: Normal S1, S2; no murmurs, rubs, or gallops.   RESPIRATORY: Normal chest excursion with respiration; breath sounds clear and equal bilaterally; no wheezes, rhonchi, or rales.  GI/: Non-distended; non-tender; no palpable organomegaly.   MS: No evidence of trauma or deformity. Normal ROM in all four extremities; non-tender to palpation; distal pulses are normal.   SKIN: Normal for age and race; warm; dry; good turgor; no apparent lesions or exudate.   NEURO/PSYCH: A & O x 4; grossly unremarkable. mood and manner are appropriate. Grooming and personal hygiene are appropriate.

## 2023-12-25 NOTE — ED PROVIDER NOTE - NSFOLLOWUPINSTRUCTIONS_ED_ALL_ED_FT
Anxiety    Generalized anxiety disorder (MEERA) is a mental disorder. It is defined as anxiety that is not necessarily related to specific events or activities or is out of proportion to said events. Symptoms include restlessness, fatigue, difficulty concentrations, irritability and difficulty concentrating. It interferes with life functions, including relationships, work, and school. If you were started on a medication make sure to take exactly as prescribed and follow up with a psychiatrist.    SEEK IMMEDIATE MEDICAL CARE IF YOU HAVE THE FOLLOWING SYMPTOMS: thoughts about hurting killing yourself, thoughts about hurting or killing somebody else, hallucinations, or worsening depression.

## 2023-12-25 NOTE — ED BEHAVIORAL HEALTH ASSESSMENT NOTE - HPI (INCLUDE ILLNESS QUALITY, SEVERITY, DURATION, TIMING, CONTEXT, MODIFYING FACTORS, ASSOCIATED SIGNS AND SYMPTOMS)
Pt is a 62 yo male with PPHx of self reported Bipolar DO and came to ED after calling EMS for worsening anxiety.     Pt reported he was diagnosed with bipolar do many years ago and has a hx of inpatient admission in 1972. He stated he was doing fine with his medication of "Clonopin, Xanax, and Trazodone" that was prescribed by his old psychiatrist. He stated his new psychiatrist refused to give him medication so he was upset with him. He was irritable when talking about his new psychiatrist discontinuing his BZD. he stated he was feeling anxious earlier today and stated experiencing "heart racing, sweaty palms, moving around". He reported he heard voice since many months and suddenly had  today telling him to "stab himself". He referred that the reason he is hearing voices because his psychiatrist discontinued his BZD. He reported feeling upset that his medications were stopped and stated nothing helps so he wants to get his medication. He asked me to give him that medications, when I inquired further and offered to discuss other options, he became upset and suddenly got up and left the room. I requested him to come back but he left and did not come back.     ED called within few mins that patient was getting agitated asking for medications. Pt is a 64 yo male with PPHx of self reported Bipolar DO and came to ED after calling EMS for worsening anxiety.     Pt reported he was diagnosed with bipolar do many years ago and has a hx of inpatient admission in 1972. He stated he was doing fine with his medication of "Clonopin, Xanax, and Trazodone" that was prescribed by his old psychiatrist. He stated his new psychiatrist refused to give him medication so he was upset with him. He was irritable when talking about his new psychiatrist discontinuing his BZD. he stated he was feeling anxious earlier today and stated experiencing "heart racing, sweaty palms, moving around". He reported he heard voice since many months and suddenly had  today telling him to "stab himself". He referred that the reason he is hearing voices because his psychiatrist discontinued his BZD. He reported feeling upset that his medications were stopped and stated nothing helps so he wants to get his medication. He asked me to give him that medications, when I inquired further and offered to discuss other options, he became upset and suddenly got up and left the room. I requested him to come back but he left and did not come back.     ED called within few mins that patient was getting agitated asking for medications.

## 2023-12-25 NOTE — ED BEHAVIORAL HEALTH ASSESSMENT NOTE - NSBHATTESTBILLING_PSY_A_CORE
[id number] diagnostic evaluation with medical services [id number] diagnostic evaluation with medical services

## 2023-12-25 NOTE — ED PROVIDER NOTE - PATIENT PORTAL LINK FT
You can access the FollowMyHealth Patient Portal offered by [facility] by registering at the following website: [email or web address]. By joining [employer]’s FollowMyHealth portal, you will also be able to view your health information using other applications (apps) compatible with our system. You can access the FollowMyHealth Patient Portal offered by [facility] by registering at the following website: [email or web address]. By joining [employer]’s FollowMyHealth portal, you will also be able to view your health information using other applications (apps) compatible with our system.

## 2023-12-25 NOTE — ED PROVIDER NOTE - CLINICAL SUMMARY MEDICAL DECISION MAKING FREE TEXT BOX
63yM bipolar p/w reported SI and hallucinations that he attributes to xanax withdrawal as his psych stopped prescribing benzos to him.  Pt not in acute withdrawal based on exam and VS.  Labs reassuring.  EKG sinus bradycardia w/o STEMI.  Telepsych consulted and ok to dc w/o concern for SI or psychosis.

## 2023-12-25 NOTE — ED ADULT NURSE NOTE - NSFALLUNIVINTERV_ED_ALL_ED
Bed/Stretcher in lowest position, wheels locked, appropriate side rails in place/Call bell, personal items and telephone in reach/Instruct patient to call for assistance before getting out of bed/chair/stretcher/Non-slip footwear applied when patient is off stretcher/[place] to call system/Physically safe environment - no spills, clutter or unnecessary equipment/Purposeful proactive rounding/Room/bathroom lighting operational, light cord in reach Bed/Stretcher in lowest position, wheels locked, appropriate side rails in place/Call bell, personal items and telephone in reach/Instruct patient to call for assistance before getting out of bed/chair/stretcher/Non-slip footwear applied when patient is off stretcher/[place] to call system/Physically safe environment - no spills, clutter or unnecessary equipment/Purposeful proactive rounding/Room/bathroom lighting operational, light cord in reach

## 2023-12-25 NOTE — ED PROVIDER NOTE - ATTENDING APP SHARED VISIT CONTRIBUTION OF CARE
63yM p/w anxiety - says his psychiatrist stopped practicing and he has run out of his xanax and clonazepam and is now having hallucinations telling him to stab himself.

## 2023-12-25 NOTE — ED PROVIDER NOTE - OBJECTIVE STATEMENT
pt with pmhx bipolar, anxiety/depression presents to ED c/o SI for the last few days and believes 2/2 new psych dr stopping his clonazepam and xanax approx 1 week ago. denies attempt, current or prev, denies IPP stay, denies HI. also has been hearing voices tell him to stab himself in the side/abdomen. Denies fever/chill/HA/dizziness/chest pain/palpitation/sob/abd pain/n/v/d/ black stool/bloody stool/urinary sxs

## 2023-12-25 NOTE — ED BEHAVIORAL HEALTH ASSESSMENT NOTE - SUMMARY
Pt is a 62 yo male with PPHx of self reported Bipolar DO and came to ED after calling EMS for worsening anxiety.     Pt reported hx of Bipolar and stated he has been stable on his regimen of Clonopin, Xanax, Trazodone, Duloxetine, and Oxcarbamazepine. His current presentation of anxiety was precipitated by discontinution of his BZD by his psychiatrist. He was insisting on obtaining his medication from ED and was clearly upset with his psychiatrist's decision. He referred that his AH were a product of his discontinuation of his BZDs. He did not engage in discussing treatment options and was directly asking for his BZD. When his expectations were further discussed, he became upset and left the interview in middle and got agitated in ED. His presentation did not indicate psychosis with his complains of AH and was clearly emerging from his dissatisfaction from discontinuation of his BZD. He does not seem to need an inpatient level of care and will be provided with resources if he plans to switch his psychiatric care. Pt is a 64 yo male with PPHx of self reported Bipolar DO and came to ED after calling EMS for worsening anxiety.     Pt reported hx of Bipolar and stated he has been stable on his regimen of Clonopin, Xanax, Trazodone, Duloxetine, and Oxcarbamazepine. His current presentation of anxiety was precipitated by discontinution of his BZD by his psychiatrist. He was insisting on obtaining his medication from ED and was clearly upset with his psychiatrist's decision. He referred that his AH were a product of his discontinuation of his BZDs. He did not engage in discussing treatment options and was directly asking for his BZD. When his expectations were further discussed, he became upset and left the interview in middle and got agitated in ED. His presentation did not indicate psychosis with his complains of AH and was clearly emerging from his dissatisfaction from discontinuation of his BZD. He does not seem to need an inpatient level of care and will be provided with resources if he plans to switch his psychiatric care.

## 2023-12-25 NOTE — ED BEHAVIORAL HEALTH NOTE - BEHAVIORAL HEALTH NOTE
===================    PRE-HOSPITAL COURSE    ==================    SOURCE:  ED provider and ED documentation     DETAILS:  Pt bibEMS for worsening AH     ============    ED COURSE    ============    SOURCE: ED provider and secondhand ED documentation.    ARRIVAL: Per ED documentation patient bibEMS to ED. Patient cooperative with triage process.     BELONGINGS: Unknown   BEHAVIOR: ED provider described patient to be calm, remains in behavioral and impulse control, and is not in restraints. Pt currently has 1:1 sitter.  Pt is not displaying aggression towards staff or others and was described as cooperative. Per provider, pt presenting with normal affect and mood is congruent with affect. Pt has a linear thought process and able to answer questions appropriately. Provider stated that the patient is not endorsing current SI/HI. Per provider pt is endorsing AH.  There are no visible marks, bruises, or lacerations on the body. Provider reports that the patient appears to have fair hygiene.   TREATMENT: No medication administered. No restraints.     VISITORS: None     -----------------------------------------------   COVID Exposure Screen- collateral (i.e. third-party, chart review, belongings, etc; include EMS and ED staff)   ---------------------------------------------------   1. Has the patient had a COVID-19 test in the last 90 days? Unknown.   2. Has the patient tested positive for COVID-19 antibodies? Unknown.   3.Has the patient received 2 doses of the COVID-19 vaccine?  Unknown.   4. In the past 10 days, has the patient been around anyone with a positive COVID-19 test?* Unknown.   5.Has the patient been out of [place] within the past 10 days? Unknown.

## 2023-12-25 NOTE — ED BEHAVIORAL HEALTH ASSESSMENT NOTE - RISK ASSESSMENT
Acute risk is elevated given his anxiety. Risk is mitigated as he is in outpatient treatment and interested in seeking care.
Negative

## 2024-02-01 ENCOUNTER — APPOINTMENT (OUTPATIENT)
Dept: UROLOGY | Facility: CLINIC | Age: 64
End: 2024-02-01

## 2024-02-02 ENCOUNTER — APPOINTMENT (OUTPATIENT)
Dept: UROLOGY | Facility: CLINIC | Age: 64
End: 2024-02-02

## 2024-03-27 ENCOUNTER — APPOINTMENT (OUTPATIENT)
Dept: NEUROLOGY | Facility: CLINIC | Age: 64
End: 2024-03-27
Payer: MEDICARE

## 2024-03-27 VITALS
WEIGHT: 210 LBS | BODY MASS INDEX: 31.83 KG/M2 | DIASTOLIC BLOOD PRESSURE: 99 MMHG | HEART RATE: 61 BPM | SYSTOLIC BLOOD PRESSURE: 160 MMHG | HEIGHT: 68 IN

## 2024-03-27 PROCEDURE — 99204 OFFICE O/P NEW MOD 45 MIN: CPT

## 2024-03-27 RX ORDER — METHYLPREDNISOLONE 4 MG/1
4 TABLET ORAL
Qty: 2 | Refills: 2 | Status: ACTIVE | COMMUNITY
Start: 2024-03-27 | End: 1900-01-01

## 2024-03-31 NOTE — HISTORY OF PRESENT ILLNESS
[FreeTextEntry1] : Mr. Laguna is a 63-year-old male who presents today in neurologic consultation for longstanding history of lower back pain he thinks over 40 years. His most recent MRI was in about 2010 or 2012 and he was told he had sciatica from a pinched nerve. He does not know what level or which side. The pain radiates from the lumbar region down both lower extremities. 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 Patient had injections in his back which only helped temporarily.   He also has symptoms of a pinched nerve in his neck. He had carpal tunnel surgery bilaterally 20 years ago. He gets numbness in his hands even after the surgery. Patient is not on any medication for his symptoms.

## 2024-03-31 NOTE — ASSESSMENT
[FreeTextEntry1] : Impression is that of lumbar and cervical radiculopathy. I recommended cervical and lumbar MRIs as well as EMGs UEs and LEs. He is also being referred to physical therapy. I sent him Medrol dose sariah for pain relief. We will see him back in follow up when workup is complete.    Total clinician time spent today on the patient is 45 minutes including preparing to see the patient, obtaining and/or reviewing and confirming history, performing medically necessary and appropriate examination, counseling and educating the patient and/or family, documenting clinical information in the EHR and communicating and/or referring to other healthcare professionals.   Entered by Salina Hinds acting as scribe for Dr. Melendez.   The documentation recorded by the scribe, in my presence, accurately reflects the service I personally performed, and the decisions made by me with my edits as appropriate. Ravi Melendez MD, FAAN, FACP Diplomate [employer] Board of Psychiatry & Neurology.

## 2024-04-02 ENCOUNTER — APPOINTMENT (OUTPATIENT)
Dept: UROLOGY | Facility: CLINIC | Age: 64
End: 2024-04-02
Payer: MEDICARE

## 2024-04-02 ENCOUNTER — LABORATORY RESULT (OUTPATIENT)
Age: 64
End: 2024-04-02

## 2024-04-02 VITALS
OXYGEN SATURATION: 98 % | BODY MASS INDEX: 31.83 KG/M2 | RESPIRATION RATE: 16 BRPM | WEIGHT: 210 LBS | HEART RATE: 55 BPM | DIASTOLIC BLOOD PRESSURE: 104 MMHG | TEMPERATURE: 98.2 F | HEIGHT: 68 IN | SYSTOLIC BLOOD PRESSURE: 163 MMHG

## 2024-04-02 DIAGNOSIS — N13.8 BENIGN PROSTATIC HYPERPLASIA WITH LOWER URINARY TRACT SYMPMS: ICD-10-CM

## 2024-04-02 DIAGNOSIS — N40.1 BENIGN PROSTATIC HYPERPLASIA WITH LOWER URINARY TRACT SYMPMS: ICD-10-CM

## 2024-04-02 DIAGNOSIS — R39.9 UNSPECIFIED SYMPTOMS AND SIGNS INVOLVING THE GENITOURINARY SYSTEM: ICD-10-CM

## 2024-04-02 DIAGNOSIS — R97.20 ELEVATED PROSTATE, SPECIFIC ANTIGEN [PSA]: ICD-10-CM

## 2024-04-02 DIAGNOSIS — R35.1 NOCTURIA: ICD-10-CM

## 2024-04-02 LAB
BILIRUB UR QL STRIP: NORMAL
GLUCOSE UR-MCNC: NORMAL
HCG UR QL: 1 EU/DL
HGB UR QL STRIP.AUTO: NORMAL
KETONES UR-MCNC: NORMAL
LEUKOCYTE ESTERASE UR QL STRIP: NORMAL
NITRITE UR QL STRIP: NORMAL
PH UR STRIP: 5.5
PROT UR STRIP-MCNC: 30
SP GR UR STRIP: 1.03

## 2024-04-02 PROCEDURE — 81003 URINALYSIS AUTO W/O SCOPE: CPT | Mod: QW

## 2024-04-02 PROCEDURE — 99214 OFFICE O/P EST MOD 30 MIN: CPT

## 2024-04-02 RX ORDER — TAMSULOSIN HYDROCHLORIDE 0.4 MG/1
0.4 CAPSULE ORAL
Qty: 90 | Refills: 3 | Status: ACTIVE | COMMUNITY
Start: 2024-04-02 | End: 1900-01-01

## 2024-04-02 NOTE — ASSESSMENT
[FreeTextEntry1] : 63 year old with elevated PSA and nocturia.   Reviewed PSA with patient and his family history.  Again recommend MR of prostate to assess for any abnormal lesions that will be targeted on MR guided biopsy.  Patient agreeable.   As for nocturia. Patient requests to try a prostate medication. Will try Tamsulosin 0.4 mg daily. Side effects reviewed.  Educated patient on importance of sleep apnea management to improve nocturia.  Recommend Kidney Bladder US and Urine testing to further evaluate.   Follow up 4-6 weeks to review studies and reassess urianary symptoms.  [Urinary Symptom or Sign (788.99\R39.89)] : implantation

## 2024-04-02 NOTE — HISTORY OF PRESENT ILLNESS
[FreeTextEntry1] : Ezequiel is a 63 year old initially seen for elevated PSA by [facility] in 2023. He was recommended to have MR of prostate, however patient refused given issues with insurance coverage.   He now has new insurance and wants to proceed with work up.   His PSA in 2023 was 5.13 ng/mL. He states his PSAs in prior years have been a normal value, I do not have these to review. Patient states that he does have a family history of prostate cancer and states that his grandfather  from prostate cancer as he "did not want to surgery." He states that his brother was recently diagnosed with prostate cancer.  Most recent PSA 2023 is 3.94 ng/mL.   Patient also has complaint of nocturia x7. He does have history of sleep apnea and stopped using cpap machine. Denies dysuria and gross hematuria.

## 2024-04-02 NOTE — END OF VISIT
[FreeTextEntry3] : I obtained history, formulated treatment plan which I discussed with the patient agree with the above transcription by the physicians assistant

## 2024-04-04 LAB — BACTERIA UR CULT: NORMAL

## 2024-04-12 ENCOUNTER — NON-APPOINTMENT (OUTPATIENT)
Age: 64
End: 2024-04-12

## 2024-04-15 ENCOUNTER — APPOINTMENT (OUTPATIENT)
Dept: MRI IMAGING | Facility: CLINIC | Age: 64
End: 2024-04-15
Payer: MEDICARE

## 2024-04-15 PROCEDURE — 72141 MRI NECK SPINE W/O DYE: CPT

## 2024-04-15 PROCEDURE — 72148 MRI LUMBAR SPINE W/O DYE: CPT

## 2024-04-18 ENCOUNTER — APPOINTMENT (OUTPATIENT)
Dept: MRI IMAGING | Facility: CLINIC | Age: 64
End: 2024-04-18

## 2024-04-22 ENCOUNTER — APPOINTMENT (OUTPATIENT)
Dept: NEUROLOGY | Facility: CLINIC | Age: 64
End: 2024-04-22
Payer: MEDICARE

## 2024-04-22 PROCEDURE — 95912 NRV CNDJ TEST 11-12 STUDIES: CPT

## 2024-04-22 PROCEDURE — 95886 MUSC TEST DONE W/N TEST COMP: CPT

## 2024-04-24 ENCOUNTER — APPOINTMENT (OUTPATIENT)
Dept: NEUROLOGY | Facility: CLINIC | Age: 64
End: 2024-04-24
Payer: MEDICARE

## 2024-04-24 PROCEDURE — 95912 NRV CNDJ TEST 11-12 STUDIES: CPT

## 2024-04-24 PROCEDURE — 95886 MUSC TEST DONE W/N TEST COMP: CPT

## 2024-04-30 ENCOUNTER — APPOINTMENT (OUTPATIENT)
Dept: NEUROLOGY | Facility: CLINIC | Age: 64
End: 2024-04-30
Payer: MEDICARE

## 2024-04-30 VITALS — WEIGHT: 210 LBS | BODY MASS INDEX: 31.83 KG/M2 | HEIGHT: 68 IN

## 2024-04-30 DIAGNOSIS — M54.12 RADICULOPATHY, CERVICAL REGION: ICD-10-CM

## 2024-04-30 DIAGNOSIS — G56.03 CARPAL TUNNEL SYNDROM,BILATERAL UPPER LIMBS: ICD-10-CM

## 2024-04-30 DIAGNOSIS — M54.16 RADICULOPATHY, LUMBAR REGION: ICD-10-CM

## 2024-04-30 PROCEDURE — 99214 OFFICE O/P EST MOD 30 MIN: CPT

## 2024-04-30 RX ORDER — GABAPENTIN 300 MG/1
300 CAPSULE ORAL 3 TIMES DAILY
Qty: 90 | Refills: 2 | Status: ACTIVE | COMMUNITY
Start: 2024-04-30 | End: 1900-01-01

## 2024-04-30 NOTE — HISTORY OF PRESENT ILLNESS
[FreeTextEntry1] : Original Presentation : Mr. Laguna is a 63-year-old male who presents today in neurologic consultation for longstanding history of lower back pain he thinks over 40 years. His most recent MRI was in about 2010 or 2012 and he was told he had sciatica from a pinched nerve. He does not know what level or which side. The pain radiates from the lumbar region down both lower extremities. 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 Patient had injections in his back which only helped temporarily.   Diagnostic Testing :    MRI Cervical 4.15.24  : Herniated disc C6-7 midline and to the right of midline with slight to mild spinal cord compression on the right. Small disc herniation paracentral to the right of midline C5-6 w/ n osteophytic ridge encroaching the right intervertebral foramen. Small diffuse osteophyte C4-5 without spinal cord compression. Small subligamentous disc herniation C3-4   MRI Lumbar Spine : Multilevel degenerative disc disease with a small subligamentous disc herniation seen at L4-5 anterolaterally on the left approaching the exiting left L5 lumbar roots. Small diffuse annular bulges L3-4 and L5-S1.   EMG Lowers 4.24.24 : Normal   EMG Uppers 4.22.24 : Bilateral Carpal tunnel syndrome (R>L) Neuropathic dysfunction in bilateral C6-8 nerve root distributions.    Today : Today I had the pleasure of seeing  Mr. Laguna in follow up. Thier prior history and testing have been reviewed.    Patient remains under our care for lumbar and cervical radiculopathy. This is a chronic condition for which he is receiving active treatment for. Today we reviewed the results of his MRI of the Cervical spine MRI Lumbar spine and EMG testing of the upper and lower extremities. Patient notes chronic 6-7/10 midline neck pain as well as 6-7/10 low back pain with numbness tingling and radiation down his bilateral legs. MRI of the Cervical spine revealed herniated disc C6-7 with slight to mild spinal cord compression on the right. Small disc herniation C5-6 w/ an osteophytic ridge encroaching the right intervertebral foramen. Small diffuse osteophyte C4-5 without spinal cord compression. Small subligamentous disc herniation C3-4. EMG testing of the UE revealed bilateral C6-7 neuropathic dysfunction. Patient was provided referral to be evaluated by Neurosurgery regarding the spinal cord compression noted on imaging prior to beginning PT for this body part.   Regarding his low back pain, MRI revealed Multilevel degenerative disc disease with a small subligamentous disc herniation seen at L4-5 anterolaterally on the left approaching the exiting left L5 lumbar roots. Small diffuse annular bulges L3-4 and L5-S1. EMG's of the LE were normal. Today we discussed conservative management for this condition including physical therapy, activity modifications and Gabapentin 300mg TID which can help with his cervical neuropathy, lumbar radiculopathy and Carpal tunnel symptoms.

## 2024-04-30 NOTE — REVIEW OF SYSTEMS
[Numbness] : numbness [Tingling] : tingling [Arthralgias] : arthralgias [Hand Weakness] :  hand weakness

## 2024-04-30 NOTE — ASSESSMENT
[FreeTextEntry1] : 63 year old male with chronic lumbar and cervical radiculopathy as well as chronic b/l CTS. Today we reviewed his Cervical MRI,  Lumbar MRI and EMGs of the UEs and LEs. He has completed the Medrol dose pack prescribed to him by Dr. Melendez and will begin Gabapentin 300mg TID as discussed today.  He was provided a referral to be evaluated by Neurosurgery regarding the spinal cord compression noted on MRI of the cervical spine prior to beginning Pt. He was provided a script to begin PT for his lumbar spine as well as a referral to see pain management for both his neck and back. He will return to the office in 3 months for re-evaluation. Patient is aware that they may call/ contact the office at any time if they have any additional questions or concerns regarding their management. All potential risks, benefits, side effects and interactions of any medications prescribed were discussed in detail with patient at the time of todays encounter.  Supervising Physician : Ravi Melendez MD

## 2024-05-10 ENCOUNTER — APPOINTMENT (OUTPATIENT)
Dept: PAIN MANAGEMENT | Facility: CLINIC | Age: 64
End: 2024-05-10

## 2024-05-22 ENCOUNTER — APPOINTMENT (OUTPATIENT)
Dept: UROLOGY | Facility: CLINIC | Age: 64
End: 2024-05-22

## 2024-08-30 ENCOUNTER — APPOINTMENT (OUTPATIENT)
Dept: NEUROLOGY | Facility: CLINIC | Age: 64
End: 2024-08-30